# Patient Record
Sex: MALE | Race: OTHER | Employment: FULL TIME | URBAN - METROPOLITAN AREA
[De-identification: names, ages, dates, MRNs, and addresses within clinical notes are randomized per-mention and may not be internally consistent; named-entity substitution may affect disease eponyms.]

---

## 2018-03-12 RX ORDER — METHYLPHENIDATE HYDROCHLORIDE 36 MG/1
36 TABLET ORAL DAILY
Qty: 30 TABLET | Refills: 0 | Status: SHIPPED | OUTPATIENT
Start: 2018-05-12 | End: 2018-06-07 | Stop reason: SDUPTHER

## 2018-03-12 RX ORDER — METHYLPHENIDATE HYDROCHLORIDE 36 MG/1
36 TABLET ORAL DAILY
Qty: 30 TABLET | Refills: 0 | Status: CANCELLED | OUTPATIENT
Start: 2018-04-12

## 2018-03-12 RX ORDER — METHYLPHENIDATE HYDROCHLORIDE 36 MG/1
36 TABLET ORAL DAILY
Qty: 90 TABLET | Refills: 0 | Status: SHIPPED | OUTPATIENT
Start: 2018-03-12 | End: 2018-03-12 | Stop reason: SDUPTHER

## 2018-03-12 RX ORDER — METHYLPHENIDATE HYDROCHLORIDE 36 MG/1
36 TABLET ORAL DAILY
Qty: 30 TABLET | Refills: 0 | Status: SHIPPED | OUTPATIENT
Start: 2018-04-12 | End: 2018-04-16 | Stop reason: SDUPTHER

## 2018-03-12 RX ORDER — METHYLPHENIDATE HYDROCHLORIDE 36 MG/1
36 TABLET ORAL DAILY
COMMUNITY
Start: 2017-10-24 | End: 2018-03-12 | Stop reason: SDUPTHER

## 2018-03-12 NOTE — TELEPHONE ENCOUNTER
Pharmacy called and said ins will not cover 90 day supply since its a narcotic.  Pharmacy needs 2 scripts to be sent post-dated for 30 and 30. Rx's request sent.

## 2018-03-13 ENCOUNTER — TELEPHONE (OUTPATIENT)
Dept: FAMILY MEDICINE | Facility: CLINIC | Age: 43
End: 2018-03-13

## 2018-03-13 NOTE — TELEPHONE ENCOUNTER
Please write a letter stating that he can have a 90 day supply of his concerta 36 mg because he is [place] of the [place] and you approve of it .  It has to have your signature and faxed to [phone]  And his cell phone is [phone] on the letter also.

## 2018-04-16 RX ORDER — METHYLPHENIDATE HYDROCHLORIDE 36 MG/1
36 TABLET ORAL DAILY
Qty: 30 TABLET | Refills: 0 | Status: SHIPPED | OUTPATIENT
Start: 2018-04-16 | End: 2018-11-20

## 2018-06-08 ENCOUNTER — TELEPHONE (OUTPATIENT)
Dept: FAMILY MEDICINE | Facility: CLINIC | Age: 43
End: 2018-06-08

## 2018-06-08 RX ORDER — METHYLPHENIDATE HYDROCHLORIDE 36 MG/1
36 TABLET ORAL DAILY
Qty: 30 TABLET | Refills: 0 | Status: SHIPPED | OUTPATIENT
Start: 2018-06-08 | End: 2018-06-12 | Stop reason: SDUPTHER

## 2018-06-08 NOTE — TELEPHONE ENCOUNTER
I faxed letter to the insurance at [phone]. I called pts wife Airam and LMOM.        Pts wife, Airam called requesting if you could generate a letter stating that pt can have a 90 day supply of his concerta 36 mg because he is out of the country and you approve of it .  It has to have your signature and faxed to his ins at [phone].  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  You had generated a letter before in March 14, 2018.  Thanks

## 2018-06-08 NOTE — LETTER
June 11, 2018    Patient: Phill Brown   YOB: 1975       To Whom it May Concern:    The above takes Concerta for ADD. He is living and working outside of the country at this time and it would be appreciated if he can have a 90 day supply of his medications.        If you have any questions or concerns, please don't hesitate to call.         Sincerely,           Harry A. Frankel, MD

## 2018-06-12 RX ORDER — METHYLPHENIDATE HYDROCHLORIDE 36 MG/1
36 TABLET ORAL DAILY
Qty: 90 TABLET | Refills: 0 | Status: SHIPPED | OUTPATIENT
Start: 2018-06-12 | End: 2018-08-30 | Stop reason: SDUPTHER

## 2018-08-31 RX ORDER — METHYLPHENIDATE HYDROCHLORIDE 36 MG/1
36 TABLET ORAL DAILY
Qty: 30 TABLET | Refills: 0 | Status: SHIPPED | OUTPATIENT
Start: 2018-08-31 | End: 2018-10-10 | Stop reason: SDUPTHER

## 2018-10-10 RX ORDER — METHYLPHENIDATE HYDROCHLORIDE 36 MG/1
36 TABLET ORAL DAILY
Qty: 30 TABLET | Refills: 0 | Status: SHIPPED | OUTPATIENT
Start: 2018-10-10 | End: 2018-11-20 | Stop reason: SDUPTHER

## 2018-10-11 NOTE — TELEPHONE ENCOUNTER
LM for pt that rx was sent to pharmacy and that he needs to schedule appt prior to next prescription

## 2018-10-31 ENCOUNTER — TELEPHONE (OUTPATIENT)
Dept: FAMILY MEDICINE | Facility: CLINIC | Age: 43
End: 2018-10-31

## 2018-10-31 NOTE — TELEPHONE ENCOUNTER
Tc will be back in town in November. Scheduled CPE for 11/20/18 at 4PM. He is wondering if he can get lab slip ahead of time to have bloodwork done before the appointment

## 2018-11-19 PROBLEM — F90.9 ADHD (ATTENTION DEFICIT HYPERACTIVITY DISORDER): Status: ACTIVE | Noted: 2017-10-24

## 2018-11-19 PROBLEM — Z82.49 FAMILY HISTORY OF CORONARY ARTERY DISEASE: Status: ACTIVE | Noted: 2018-11-19

## 2018-11-19 RX ORDER — FLUTICASONE PROPIONATE 50 MCG
SPRAY, SUSPENSION (ML) NASAL
COMMUNITY
Start: 2016-04-27 | End: 2018-11-20

## 2018-11-19 RX ORDER — ALBUTEROL SULFATE 90 UG/1
INHALANT RESPIRATORY (INHALATION)
COMMUNITY
Start: 2016-04-26 | End: 2018-11-20

## 2018-11-19 RX ORDER — MAGNESIUM 200 MG
200 TABLET ORAL
COMMUNITY
Start: 2016-07-25 | End: 2018-11-20

## 2018-11-20 ENCOUNTER — OFFICE VISIT (OUTPATIENT)
Dept: FAMILY MEDICINE | Facility: CLINIC | Age: 43
End: 2018-11-20
Payer: COMMERCIAL

## 2018-11-20 VITALS
HEIGHT: 75 IN | WEIGHT: 268.4 LBS | OXYGEN SATURATION: 97 % | SYSTOLIC BLOOD PRESSURE: 125 MMHG | HEART RATE: 74 BPM | TEMPERATURE: 97.8 F | DIASTOLIC BLOOD PRESSURE: 73 MMHG | BODY MASS INDEX: 33.37 KG/M2

## 2018-11-20 DIAGNOSIS — Z00.00 PREVENTATIVE HEALTH CARE: Primary | ICD-10-CM

## 2018-11-20 DIAGNOSIS — F90.2 ATTENTION DEFICIT HYPERACTIVITY DISORDER (ADHD), COMBINED TYPE: ICD-10-CM

## 2018-11-20 PROCEDURE — 99396 PREV VISIT EST AGE 40-64: CPT | Mod: 25 | Performed by: FAMILY MEDICINE

## 2018-11-20 PROCEDURE — 90471 IMMUNIZATION ADMIN: CPT | Performed by: FAMILY MEDICINE

## 2018-11-20 PROCEDURE — 90686 IIV4 VACC NO PRSV 0.5 ML IM: CPT | Performed by: FAMILY MEDICINE

## 2018-11-20 RX ORDER — METHYLPHENIDATE HYDROCHLORIDE 36 MG/1
36 TABLET ORAL DAILY
Qty: 90 TABLET | Refills: 0 | Status: SHIPPED | OUTPATIENT
Start: 2018-11-20 | End: 2018-11-20 | Stop reason: SDUPTHER

## 2018-11-20 RX ORDER — ALBUTEROL SULFATE 90 UG/1
2 INHALANT RESPIRATORY (INHALATION) EVERY 6 HOURS PRN
Qty: 1 INHALER | Refills: 1 | Status: SHIPPED | OUTPATIENT
Start: 2018-11-20 | End: 2018-11-20 | Stop reason: SDUPTHER

## 2018-11-20 RX ORDER — ALBUTEROL SULFATE 90 UG/1
2 INHALANT RESPIRATORY (INHALATION) EVERY 6 HOURS PRN
Qty: 1 INHALER | Refills: 1 | Status: SHIPPED | OUTPATIENT
Start: 2018-11-20 | End: 2019-05-17

## 2018-11-20 RX ORDER — METHYLPHENIDATE HYDROCHLORIDE 36 MG/1
36 TABLET ORAL DAILY
Qty: 90 TABLET | Refills: 0 | Status: SHIPPED | OUTPATIENT
Start: 2018-11-20 | End: 2019-03-15 | Stop reason: SDUPTHER

## 2018-11-20 ASSESSMENT — ENCOUNTER SYMPTOMS
NERVOUS/ANXIOUS: 0
CONSTIPATION: 0
BLOOD IN STOOL: 0
DIARRHEA: 0
BACK PAIN: 0
POLYPHAGIA: 0
HEADACHES: 0
ARTHRALGIAS: 0
BRUISES/BLEEDS EASILY: 0
POLYDIPSIA: 0
SHORTNESS OF BREATH: 0
FATIGUE: 0

## 2018-11-20 NOTE — PROGRESS NOTES
[facility]  [address] [place] [address]  [phone]       Reason for visit:   Chief Complaint   Patient presents with   • Annual Exam      HPI   Phill Brown is a 43 y.o. male who presents for CPE. He has ADHD and is well controlled and compliant with medications. He is exercising and watching his diet. He is doing yoga which has improved his back problems. He lives in [place].      History reviewed. No pertinent past medical history.  History reviewed. No pertinent surgical history.  Social History     Social History   • Marital status:      Spouse name: N/A   • Number of children: N/A   • Years of education: N/A     Occupational History   • Not on file.     Social History Main Topics   • Smoking status: Never Smoker   • Smokeless tobacco: Never Used   • Alcohol use No   • Drug use: Unknown   • Sexual activity: Not on file     Other Topics Concern   • Not on file     Social History Narrative    Do you wear your seatbelt? Yes    Do you have smoke detector in your home? Yes    Do you have a carbon monoxide detector in your home? Yes    Current Occupation?    Current Marital Status?          History reviewed. No pertinent family history.  [employer]ill[employer]  Current Outpatient Prescriptions   Medication Sig Dispense Refill   • methylphenidate HCl 36 mg ER tablet Take 36 mg by mouth daily. 90 tablet 0   • multivit with minerals/lutein (MULTIVITAMIN 50 PLUS ORAL) No SIG Entered     • albuterol HFA (VENTOLIN HFA) 90 mcg/actuation inhaler Inhale 2 puffs every 6 (six) hours as needed for wheezing. 1 Inhaler 1     No current facility-administered medications for this visit.        Review of Systems   Constitutional: Negative for fatigue.   HENT: Negative for hearing loss.    Eyes: Negative for visual disturbance.   Respiratory: Negative for shortness of breath.    Cardiovascular: Negative for chest pain.   Gastrointestinal: Negative for blood in  "stool, constipation and diarrhea.   Endocrine: Negative for polydipsia, polyphagia and polyuria.   Musculoskeletal: Negative for arthralgias and back pain.   Skin: Negative for rash.   Neurological: Negative for headaches.   Hematological: Does not bruise/bleed easily.   Psychiatric/Behavioral: The patient is not nervous/anxious.      Objective   Vitals:    11/20/18 1619   BP: 125/73   BP Location: Left upper arm   Patient Position: Sitting   Pulse: 74   Temp: 36.6 °C (97.8 °F)   TempSrc: Oral   SpO2: 97%   Weight: 122 kg (268 lb 6.4 oz)   Height: 1.905 m (6' 3\")     Body mass index is 33.55 kg/m².  Physical Exam   Constitutional: He is oriented to person, place, and time. He appears well-developed and well-nourished.   HENT:   Right Ear: Tympanic membrane normal.   Left Ear: Tympanic membrane normal.   Mouth/Throat: Oropharynx is clear and moist.   Eyes: Conjunctivae are normal. Pupils are equal, round, and reactive to light.   Neck: No thyromegaly present.   Cardiovascular: Normal rate, regular rhythm and intact distal pulses.    Murmur heard.  Pulmonary/Chest: Effort normal and breath sounds normal.   Abdominal: Soft. Bowel sounds are normal. There is no hepatosplenomegaly. Hernia confirmed negative in the right inguinal area and confirmed negative in the left inguinal area.   Genitourinary: Testes normal.   Musculoskeletal: Normal range of motion. He exhibits no edema.   Neurological: He is alert and oriented to person, place, and time. He displays normal reflexes.   Skin: Skin is warm and dry.   Psychiatric: He has a normal mood and affect. His behavior is normal. Judgment and thought content normal.       Procedures    Lab Results   Component Value Date    WBC 6.1 07/25/2017    HGB 16.4 07/25/2017    HCT 48.9 07/25/2017     07/25/2017    CHOL 194 07/25/2017    TRIG 94 07/25/2017    HDL 69 07/25/2017    LDLCALC 106 (H) 07/25/2017    ALT 25 07/25/2017    AST 24 07/25/2017     07/25/2017    K 4.2 " 07/25/2017     07/25/2017    CREATININE 1.59 (H) 07/25/2017    BUN 23 07/25/2017    CO2 23 07/25/2017    GLUCOSE 90 07/25/2017           Assessment   Problem List Items Addressed This Visit     ADHD (attention deficit hyperactivity disorder)     Stable  Continue medications   Labs         Relevant Medications    methylphenidate HCl 36 mg ER tablet    Preventative health care - Primary     Labs  Flu vax         Relevant Orders    CBC and differential    Comprehensive metabolic panel    Lipid panel              Harry A. Frankel, MD  11/20/2018

## 2018-11-21 ENCOUNTER — TELEPHONE (OUTPATIENT)
Dept: FAMILY MEDICINE | Facility: CLINIC | Age: 43
End: 2018-11-21

## 2018-11-24 LAB
ALBUMIN SERPL-MCNC: 4.3 G/DL (ref 3.5–5.5)
ALBUMIN/GLOB SERPL: 1.9 {RATIO} (ref 1.2–2.2)
ALP SERPL-CCNC: 51 IU/L (ref 39–117)
ALT SERPL-CCNC: 34 IU/L (ref 0–44)
AST SERPL-CCNC: 32 IU/L (ref 0–40)
BASOPHILS # BLD AUTO: 0 X10E3/UL (ref 0–0.2)
BASOPHILS NFR BLD AUTO: 1 %
BILIRUB SERPL-MCNC: 0.6 MG/DL (ref 0–1.2)
BUN SERPL-MCNC: 20 MG/DL (ref 6–24)
BUN/CREAT SERPL: 16 (ref 9–20)
CALCIUM SERPL-MCNC: 9.1 MG/DL (ref 8.7–10.2)
CHLORIDE SERPL-SCNC: 103 MMOL/L (ref 96–106)
CHOLEST SERPL-MCNC: 163 MG/DL (ref 100–199)
CO2 SERPL-SCNC: 24 MMOL/L (ref 20–29)
CREAT SERPL-MCNC: 1.23 MG/DL (ref 0.76–1.27)
EOSINOPHIL # BLD AUTO: 0.1 X10E3/UL (ref 0–0.4)
EOSINOPHIL NFR BLD AUTO: 2 %
ERYTHROCYTE [DISTWIDTH] IN BLOOD BY AUTOMATED COUNT: 14.7 % (ref 12.3–15.4)
GLOBULIN SER CALC-MCNC: 2.3 G/DL (ref 1.5–4.5)
GLUCOSE SERPL-MCNC: 99 MG/DL (ref 65–99)
HCT VFR BLD AUTO: 46 % (ref 37.5–51)
HDLC SERPL-MCNC: 55 MG/DL
HGB BLD-MCNC: 15.6 G/DL (ref 13–17.7)
IMM GRANULOCYTES # BLD: 0 X10E3/UL (ref 0–0.1)
IMM GRANULOCYTES NFR BLD: 0 %
LAB CORP EGFR IF AFRICN AM: 82 ML/MIN/1.73
LAB CORP EGFR IF NONAFRICN AM: 71 ML/MIN/1.73
LDLC SERPL CALC-MCNC: 89 MG/DL (ref 0–99)
LYMPHOCYTES # BLD AUTO: 2.5 X10E3/UL (ref 0.7–3.1)
LYMPHOCYTES NFR BLD AUTO: 38 %
MCH RBC QN AUTO: 31 PG (ref 26.6–33)
MCHC RBC AUTO-ENTMCNC: 33.9 G/DL (ref 31.5–35.7)
MCV RBC AUTO: 91 FL (ref 79–97)
MONOCYTES # BLD AUTO: 0.6 X10E3/UL (ref 0.1–0.9)
MONOCYTES NFR BLD AUTO: 10 %
NEUTROPHILS # BLD AUTO: 3.3 X10E3/UL (ref 1.4–7)
NEUTROPHILS NFR BLD AUTO: 49 %
PLATELET # BLD AUTO: 214 X10E3/UL (ref 150–379)
POTASSIUM SERPL-SCNC: 4.3 MMOL/L (ref 3.5–5.2)
PROT SERPL-MCNC: 6.6 G/DL (ref 6–8.5)
RBC # BLD AUTO: 5.04 X10E6/UL (ref 4.14–5.8)
SODIUM SERPL-SCNC: 141 MMOL/L (ref 134–144)
TRIGL SERPL-MCNC: 95 MG/DL (ref 0–149)
VLDLC SERPL CALC-MCNC: 19 MG/DL (ref 5–40)
WBC # BLD AUTO: 6.5 X10E3/UL (ref 3.4–10.8)

## 2019-03-18 RX ORDER — METHYLPHENIDATE HYDROCHLORIDE 36 MG/1
36 TABLET ORAL DAILY
Qty: 90 TABLET | Refills: 0 | Status: SHIPPED | OUTPATIENT
Start: 2019-03-18 | End: 2019-04-26 | Stop reason: SDUPTHER

## 2019-04-26 RX ORDER — METHYLPHENIDATE HYDROCHLORIDE 36 MG/1
36 TABLET ORAL DAILY
Qty: 30 TABLET | Refills: 0 | Status: SHIPPED | OUTPATIENT
Start: 2019-04-26 | End: 2019-12-27 | Stop reason: SDUPTHER

## 2019-05-06 ENCOUNTER — HOSPITAL ENCOUNTER (OUTPATIENT)
Dept: RADIOLOGY | Facility: HOSPITAL | Age: 44
Discharge: HOME | End: 2019-05-06
Attending: FAMILY MEDICINE
Payer: COMMERCIAL

## 2019-05-06 ENCOUNTER — OFFICE VISIT (OUTPATIENT)
Dept: FAMILY MEDICINE | Facility: CLINIC | Age: 44
End: 2019-05-06
Payer: COMMERCIAL

## 2019-05-06 VITALS
TEMPERATURE: 98.6 F | WEIGHT: 256 LBS | DIASTOLIC BLOOD PRESSURE: 89 MMHG | BODY MASS INDEX: 31.83 KG/M2 | HEART RATE: 80 BPM | HEIGHT: 75 IN | OXYGEN SATURATION: 97 % | SYSTOLIC BLOOD PRESSURE: 139 MMHG

## 2019-05-06 DIAGNOSIS — A90 DENGUE FEVER: Primary | ICD-10-CM

## 2019-05-06 DIAGNOSIS — A90 DENGUE FEVER: ICD-10-CM

## 2019-05-06 DIAGNOSIS — F90.2 ATTENTION DEFICIT HYPERACTIVITY DISORDER (ADHD), COMBINED TYPE: ICD-10-CM

## 2019-05-06 PROCEDURE — 99213 OFFICE O/P EST LOW 20 MIN: CPT | Performed by: FAMILY MEDICINE

## 2019-05-06 PROCEDURE — 71046 X-RAY EXAM CHEST 2 VIEWS: CPT

## 2019-05-06 RX ORDER — PREDNISONE 20 MG/1
40 TABLET ORAL DAILY
Qty: 10 TABLET | Refills: 0 | Status: SHIPPED | OUTPATIENT
Start: 2019-05-06 | End: 2019-05-17

## 2019-05-06 RX ORDER — BENZONATATE 100 MG/1
100 CAPSULE ORAL 4 TIMES DAILY PRN
Qty: 30 CAPSULE | Refills: 1 | Status: SHIPPED | OUTPATIENT
Start: 2019-05-06 | End: 2019-05-17

## 2019-05-06 ASSESSMENT — ENCOUNTER SYMPTOMS
COUGH: 1
FEVER: 0
SHORTNESS OF BREATH: 0
SORE THROAT: 0

## 2019-05-06 NOTE — ASSESSMENT & PLAN NOTE
He has a residual cough but his high fevers are gone  CXR  Tessalon pearls  Prednisone 40 mg daily for 5 days

## 2019-05-06 NOTE — PROGRESS NOTES
[facility]  [address] [place] [address]  [phone]       Reason for visit:   Chief Complaint   Patient presents with   • Cough     x 1 week      HPI   Phill Brown is a 43 y.o. male who presents with sick. He was treated in [place] for Dengue fever. He is continuing to cough. He has been needing his inhaler. No fever for about 1 week.      History reviewed. No pertinent past medical history.  History reviewed. No pertinent surgical history.  Social History     Social History   • Marital status:      Spouse name: N/A   • Number of children: N/A   • Years of education: N/A     Occupational History   • Not on file.     Social History Main Topics   • Smoking status: Never Smoker   • Smokeless tobacco: Never Used   • Alcohol use No   • Drug use: Unknown   • Sexual activity: Not on file     Other Topics Concern   • Not on file     Social History Narrative    Do you wear your seatbelt? Yes    Do you have smoke detector in your home? Yes    Do you have a carbon monoxide detector in your home? Yes    Current Occupation?    Current Marital Status?          History reviewed. No pertinent family history.  [employer]ill[employer]  Current Outpatient Prescriptions   Medication Sig Dispense Refill   • methylphenidate HCl 36 mg ER tablet Take 36 mg by mouth daily. 30 tablet 0   • multivit with minerals/lutein (MULTIVITAMIN 50 PLUS ORAL) No SIG Entered     • benzonatate (TESSALON PERLES) 100 mg capsule Take 1 capsule (100 mg total) by mouth 4 (four) times a day as needed for cough for up to 10 days. 30 capsule 1   • predniSONE (DELTASONE) 20 mg tablet Take 2 tablets (40 mg total) by mouth daily for 5 days. 10 tablet 0     No current facility-administered medications for this visit.        Patient Active Problem List    Diagnosis Date Noted   • Dengue fever 05/06/2019   • Preventative health care 11/20/2018   • Family history of coronary artery disease 11/19/2018  "  • ADHD (attention deficit hyperactivity disorder) 10/24/2017   • Atypical chest pain 04/27/2016   • Shortness of breath 04/27/2016   • Arthritis 04/26/2016   • Asthma 04/26/2016         Review of Systems   Constitutional: Negative for fever.   HENT: Negative for ear pain and sore throat.    Respiratory: Positive for cough. Negative for shortness of breath.      Objective   Vitals:    05/06/19 1558   BP: 139/89   BP Location: Left upper arm   Patient Position: Sitting   Pulse: 80   Temp: 37 °C (98.6 °F)   TempSrc: Oral   SpO2: 97%   Weight: 116 kg (256 lb)   Height: 1.905 m (6' 3\")     Body mass index is 32 kg/m².  Physical Exam   Constitutional: He appears well-developed and well-nourished.   HENT:   Right Ear: Tympanic membrane normal.   Left Ear: Tympanic membrane normal.   Mouth/Throat: Oropharynx is clear and moist.   Pulmonary/Chest: Effort normal and breath sounds normal.   Lymphadenopathy:     He has no cervical adenopathy.       Procedures    Lab Results   Component Value Date    WBC 6.5 11/23/2018    HGB 15.6 11/23/2018    HCT 46.0 11/23/2018     11/23/2018    CHOL 163 11/23/2018    TRIG 95 11/23/2018    HDL 55 11/23/2018    LDLCALC 89 11/23/2018    ALT 34 11/23/2018    AST 32 11/23/2018     11/23/2018    K 4.3 11/23/2018     11/23/2018    CREATININE 1.23 11/23/2018    BUN 20 11/23/2018    CO2 24 11/23/2018    GLUCOSE 99 11/23/2018           Assessment   Problem List Items Addressed This Visit     ADHD (attention deficit hyperactivity disorder)     Stable  Continue meds         Dengue fever - Primary     He has a residual cough but his high fevers are gone  CXR  Tessalon pearls  Prednisone 40 mg daily for 5 days         Relevant Orders    X-RAY CHEST 2 VIEWS              Harry A. Frankel, MD  5/6/2019                   "

## 2019-05-07 ENCOUNTER — TELEPHONE (OUTPATIENT)
Dept: FAMILY MEDICINE | Facility: CLINIC | Age: 44
End: 2019-05-07

## 2019-05-07 NOTE — TELEPHONE ENCOUNTER
Tc is waiting for results from chest xray, was expecting call last night. Can you please call Airam with results? 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

## 2019-05-17 ENCOUNTER — OFFICE VISIT (OUTPATIENT)
Dept: FAMILY MEDICINE | Facility: CLINIC | Age: 44
End: 2019-05-17
Payer: COMMERCIAL

## 2019-05-17 VITALS
WEIGHT: 270 LBS | BODY MASS INDEX: 33.57 KG/M2 | HEART RATE: 88 BPM | SYSTOLIC BLOOD PRESSURE: 123 MMHG | DIASTOLIC BLOOD PRESSURE: 84 MMHG | OXYGEN SATURATION: 97 % | HEIGHT: 75 IN | TEMPERATURE: 97.8 F

## 2019-05-17 DIAGNOSIS — R05.9 COUGH: Primary | ICD-10-CM

## 2019-05-17 PROCEDURE — 99213 OFFICE O/P EST LOW 20 MIN: CPT | Performed by: FAMILY MEDICINE

## 2019-05-17 RX ORDER — AZITHROMYCIN 250 MG/1
TABLET, FILM COATED ORAL
Qty: 6 TABLET | Refills: 0 | Status: SHIPPED | OUTPATIENT
Start: 2019-05-17 | End: 2019-06-04 | Stop reason: SDUPTHER

## 2019-05-17 RX ORDER — ALBUTEROL SULFATE 90 UG/1
INHALANT RESPIRATORY (INHALATION)
Refills: 1 | COMMUNITY
Start: 2019-05-07 | End: 2021-08-26 | Stop reason: SDUPTHER

## 2019-05-17 ASSESSMENT — ENCOUNTER SYMPTOMS
SHORTNESS OF BREATH: 1
WHEEZING: 0
FEVER: 0
COUGH: 1

## 2019-05-17 NOTE — ASSESSMENT & PLAN NOTE
Cough is secondary to Dengue fever.  He is markely better from last week  His CXR was normal.  He may have a secondary bronchitis and he will be traveling.  Mucinex DM  Zithromax Z david

## 2019-05-17 NOTE — PROGRESS NOTES
[facility]  [address] [place] [address]  [phone]       Reason for visit:   Chief Complaint   Patient presents with   • Cough      HPI   Phill Brown is a 43 y.o. male who presents with cough. He is markedly better than last week but hs still has chest congestion. No fever. Mucous is clear. No worsening since he finished the prednisone. He has had associated left shoulder pain.      Past Medical History:   Diagnosis Date   • ADHD      Past Surgical History:   Procedure Laterality Date   • APPENDECTOMY       Social History     Social History   • Marital status:      Spouse name: N/A   • Number of children: N/A   • Years of education: N/A     Occupational History   • Not on file.     Social History Main Topics   • Smoking status: Never Smoker   • Smokeless tobacco: Never Used   • Alcohol use No   • Drug use: No   • Sexual activity: Not on file     Other Topics Concern   • Not on file     Social History Narrative    Do you wear your seatbelt? Yes    Do you have smoke detector in your home? Yes    Do you have a carbon monoxide detector in your home? Yes    Current Occupation?    Current Marital Status?          History reviewed. No pertinent family history.  [employer]ill[employer]  Current Outpatient Prescriptions   Medication Sig Dispense Refill   • albuterol HFA (VENTOLIN HFA) 90 mcg/actuation inhaler INHALE 2 PUFFS BY MOUTH EVERY 6 HOURS AS NEEDED WHEEZING  1   • methylphenidate HCl 36 mg ER tablet Take 36 mg by mouth daily. 30 tablet 0   • multivit with minerals/lutein (MULTIVITAMIN 50 PLUS ORAL) No SIG Entered     • azithromycin (ZITHROMAX) 250 mg tablet Take 2 tablets the first day, then 1 tablet daily for 4 days. 6 tablet 0     No current facility-administered medications for this visit.        Patient Active Problem List    Diagnosis Date Noted   • Cough 05/17/2019   • Dengue fever 05/06/2019   • Preventative health care 11/20/2018   •  "Family history of coronary artery disease 11/19/2018   • ADHD (attention deficit hyperactivity disorder) 10/24/2017   • Atypical chest pain 04/27/2016   • Shortness of breath 04/27/2016   • Arthritis 04/26/2016   • Asthma 04/26/2016         Review of Systems   Constitutional: Negative for fever.   Respiratory: Positive for cough and shortness of breath. Negative for wheezing.      Objective   Vitals:    05/17/19 0819   BP: 123/84   BP Location: Left upper arm   Patient Position: Sitting   Pulse: 88   Temp: 36.6 °C (97.8 °F)   TempSrc: Oral   SpO2: 97%   Weight: 122 kg (270 lb)   Height: 1.905 m (6' 3\")     Body mass index is 33.75 kg/m².  Physical Exam   Constitutional: He appears well-developed and well-nourished.   HENT:   Right Ear: Tympanic membrane normal.   Left Ear: Tympanic membrane normal.   Mouth/Throat: Oropharynx is clear and moist.   Cardiovascular: Normal rate, regular rhythm, normal heart sounds and intact distal pulses.    No murmur heard.  Pulmonary/Chest: Effort normal and breath sounds normal. He has no wheezes. He has no rales.       Procedures    Lab Results   Component Value Date    WBC 6.5 11/23/2018    HGB 15.6 11/23/2018    HCT 46.0 11/23/2018     11/23/2018    CHOL 163 11/23/2018    TRIG 95 11/23/2018    HDL 55 11/23/2018    LDLCALC 89 11/23/2018    ALT 34 11/23/2018    AST 32 11/23/2018     11/23/2018    K 4.3 11/23/2018     11/23/2018    CREATININE 1.23 11/23/2018    BUN 20 11/23/2018    CO2 24 11/23/2018    GLUCOSE 99 11/23/2018           Assessment   Problem List Items Addressed This Visit     Cough - Primary     Cough is secondary to Dengue fever.  He is markely better from last week  His CXR was normal.  He may have a secondary bronchitis and he will be traveling.  Mucinex DM  Zithromax Z david                   Harry A. Frankel, MD  5/17/2019                   "

## 2019-06-04 ENCOUNTER — OFFICE VISIT (OUTPATIENT)
Dept: FAMILY MEDICINE | Facility: CLINIC | Age: 44
End: 2019-06-04
Payer: COMMERCIAL

## 2019-06-04 VITALS
HEART RATE: 83 BPM | WEIGHT: 268 LBS | TEMPERATURE: 98.2 F | HEIGHT: 75 IN | OXYGEN SATURATION: 97 % | DIASTOLIC BLOOD PRESSURE: 80 MMHG | BODY MASS INDEX: 33.32 KG/M2 | SYSTOLIC BLOOD PRESSURE: 144 MMHG

## 2019-06-04 DIAGNOSIS — F90.2 ATTENTION DEFICIT HYPERACTIVITY DISORDER (ADHD), COMBINED TYPE: ICD-10-CM

## 2019-06-04 DIAGNOSIS — J40 BRONCHITIS: Primary | ICD-10-CM

## 2019-06-04 PROCEDURE — 99213 OFFICE O/P EST LOW 20 MIN: CPT | Performed by: FAMILY MEDICINE

## 2019-06-04 RX ORDER — FLUTICASONE PROPIONATE 50 MCG
1 SPRAY, SUSPENSION (ML) NASAL DAILY
Qty: 1 BOTTLE | Refills: 5 | Status: SHIPPED | OUTPATIENT
Start: 2019-06-04 | End: 2019-12-27

## 2019-06-04 RX ORDER — AZITHROMYCIN 250 MG/1
TABLET, FILM COATED ORAL
Qty: 6 TABLET | Refills: 0 | Status: SHIPPED | OUTPATIENT
Start: 2019-06-04 | End: 2019-12-27

## 2019-06-04 RX ORDER — AZELASTINE 1 MG/ML
1 SPRAY, METERED NASAL 2 TIMES DAILY
Qty: 30 ML | Refills: 5 | Status: SHIPPED | OUTPATIENT
Start: 2019-06-04 | End: 2019-12-27

## 2019-06-04 ASSESSMENT — ENCOUNTER SYMPTOMS
COUGH: 1
SORE THROAT: 0

## 2019-06-04 NOTE — PROGRESS NOTES
[facility]  [address] [place] [address]  [phone]       Reason for visit:   Chief Complaint   Patient presents with   • Cough   • Post Nasal Drip      HPI   Phill Brown is a 43 y.o. male who presents with sick.He has cough, fever and PND. He has been on prednisone that he got in [place]. He is still on meds for ADHD and it doing well.      Past Medical History:   Diagnosis Date   • ADHD      Past Surgical History:   Procedure Laterality Date   • APPENDECTOMY       Social History     Social History   • Marital status:      Spouse name: N/A   • Number of children: N/A   • Years of education: N/A     Occupational History   • Not on file.     Social History Main Topics   • Smoking status: Never Smoker   • Smokeless tobacco: Never Used   • Alcohol use No   • Drug use: No   • Sexual activity: Not on file     Other Topics Concern   • Not on file     Social History Narrative    Do you wear your seatbelt? Yes    Do you have smoke detector in your home? Yes    Do you have a carbon monoxide detector in your home? Yes    Current Occupation?    Current Marital Status?          History reviewed. No pertinent family history.  [employer]ill[employer]  Current Outpatient Prescriptions   Medication Sig Dispense Refill   • albuterol HFA (VENTOLIN HFA) 90 mcg/actuation inhaler INHALE 2 PUFFS BY MOUTH EVERY 6 HOURS AS NEEDED WHEEZING  1   • methylphenidate HCl 36 mg ER tablet Take 36 mg by mouth daily. 30 tablet 0   • multivit with minerals/lutein (MULTIVITAMIN 50 PLUS ORAL) No SIG Entered     • azelastine (ASTELIN) 137 mcg (0.1 %) nasal spray Administer 1 spray into each nostril 2 (two) times a day. Use in each nostril as directed. 30 mL 5   • azithromycin (ZITHROMAX) 250 mg tablet Take 2 tablets the first day, then 1 tablet daily for 4 days. 6 tablet 0   • fluticasone propionate (FLONASE) 50 mcg/actuation nasal spray Administer 1 spray into each nostril daily. 1  "Bottle 5     No current facility-administered medications for this visit.        Patient Active Problem List    Diagnosis Date Noted   • Bronchitis 06/04/2019   • Cough 05/17/2019   • Dengue fever 05/06/2019   • Preventative health care 11/20/2018   • Family history of coronary artery disease 11/19/2018   • ADHD (attention deficit hyperactivity disorder) 10/24/2017   • Atypical chest pain 04/27/2016   • Shortness of breath 04/27/2016   • Arthritis 04/26/2016   • Asthma 04/26/2016         Review of Systems   HENT: Positive for congestion and ear pain. Negative for sore throat.    Respiratory: Positive for cough.      Objective   Vitals:    06/04/19 1453   BP: (!) 144/80   BP Location: Left upper arm   Patient Position: Sitting   Pulse: 83   Temp: 36.8 °C (98.2 °F)   TempSrc: Oral   SpO2: 97%   Weight: 122 kg (268 lb)   Height: 1.905 m (6' 3\")     Body mass index is 33.5 kg/m².  Physical Exam   Constitutional: He appears well-developed and well-nourished.   HENT:   Right Ear: Tympanic membrane normal.   Left Ear: Tympanic membrane normal.   Mouth/Throat: Oropharynx is clear and moist.   Pulmonary/Chest: Effort normal and breath sounds normal.       Procedures    Lab Results   Component Value Date    WBC 6.5 11/23/2018    HGB 15.6 11/23/2018    HCT 46.0 11/23/2018     11/23/2018    CHOL 163 11/23/2018    TRIG 95 11/23/2018    HDL 55 11/23/2018    LDLCALC 89 11/23/2018    ALT 34 11/23/2018    AST 32 11/23/2018     11/23/2018    K 4.3 11/23/2018     11/23/2018    CREATININE 1.23 11/23/2018    BUN 20 11/23/2018    CO2 24 11/23/2018    GLUCOSE 99 11/23/2018           Assessment   Problem List Items Addressed This Visit     ADHD (attention deficit hyperactivity disorder)     Well controlled  Continue meds         Bronchitis - Primary     Zithromax Z david  Flonase  Astelin  Push fluids                   Harry A. Frankel, MD  6/5/2019                   "

## 2019-12-27 ENCOUNTER — OFFICE VISIT (OUTPATIENT)
Dept: FAMILY MEDICINE | Facility: CLINIC | Age: 44
End: 2019-12-27
Payer: COMMERCIAL

## 2019-12-27 VITALS
HEIGHT: 75 IN | WEIGHT: 269.8 LBS | HEART RATE: 96 BPM | OXYGEN SATURATION: 96 % | SYSTOLIC BLOOD PRESSURE: 105 MMHG | DIASTOLIC BLOOD PRESSURE: 67 MMHG | BODY MASS INDEX: 33.55 KG/M2 | TEMPERATURE: 97.9 F

## 2019-12-27 DIAGNOSIS — Z00.00 PREVENTATIVE HEALTH CARE: Primary | ICD-10-CM

## 2019-12-27 DIAGNOSIS — G89.29 CHRONIC LEFT SHOULDER PAIN: ICD-10-CM

## 2019-12-27 DIAGNOSIS — M25.512 CHRONIC LEFT SHOULDER PAIN: ICD-10-CM

## 2019-12-27 DIAGNOSIS — F90.2 ATTENTION DEFICIT HYPERACTIVITY DISORDER (ADHD), COMBINED TYPE: ICD-10-CM

## 2019-12-27 PROCEDURE — 90686 IIV4 VACC NO PRSV 0.5 ML IM: CPT | Performed by: FAMILY MEDICINE

## 2019-12-27 PROCEDURE — 90471 IMMUNIZATION ADMIN: CPT | Performed by: FAMILY MEDICINE

## 2019-12-27 PROCEDURE — 99396 PREV VISIT EST AGE 40-64: CPT | Mod: 25 | Performed by: FAMILY MEDICINE

## 2019-12-27 RX ORDER — METHYLPHENIDATE HYDROCHLORIDE 36 MG/1
36 TABLET ORAL DAILY
Qty: 90 TABLET | Refills: 0 | Status: SHIPPED | OUTPATIENT
Start: 2019-12-27 | End: 2020-10-26 | Stop reason: SDUPTHER

## 2019-12-27 RX ORDER — TALC
3 POWDER (GRAM) TOPICAL NIGHTLY
COMMUNITY
End: 2023-06-01

## 2019-12-27 ASSESSMENT — ENCOUNTER SYMPTOMS
BRUISES/BLEEDS EASILY: 0
POLYPHAGIA: 0
BLOOD IN STOOL: 0
BACK PAIN: 0
SHORTNESS OF BREATH: 0
FATIGUE: 0
CONSTIPATION: 0
NERVOUS/ANXIOUS: 0
DIARRHEA: 0
HEADACHES: 0
POLYDIPSIA: 0
ARTHRALGIAS: 1

## 2019-12-27 NOTE — PROGRESS NOTES
[place] [address]  [place] [address]  [phone]       Reason for visit:   Chief Complaint   Patient presents with   • Follow-up     For med check      HPI   Phill Brown is a 44 y.o. male who presents for CPE. He has ADHD and is well controlled on meds. He has left shoulder pain. He exercises regularly. Diet is fair.      Past Medical History:   Diagnosis Date   • ADHD      Past Surgical History:   Procedure Laterality Date   • APPENDECTOMY       Social History     Socioeconomic History   • Marital status:      Spouse name: Not on file   • Number of children: Not on file   • Years of education: Not on file   • Highest education level: Not on file   Occupational History   • Not on file   Social Needs   • Financial resource strain: Not on file   • Food insecurity:     Worry: Not on file     Inability: Not on file   • Transportation needs:     Medical: Not on file     Non-medical: Not on file   Tobacco Use   • Smoking status: Never Smoker   • Smokeless tobacco: Never Used   Substance and Sexual Activity   • Alcohol use: No   • Drug use: No   • Sexual activity: Not on file   Lifestyle   • Physical activity:     Days per week: Not on file     Minutes per session: Not on file   • Stress: Not on file   Relationships   • Social connections:     Talks on phone: Not on file     Gets together: Not on file     Attends [employer]ical service: Not on file     Active member of club or organization: Not on file     Attends meetings of clubs or organizations: Not on file     Relationship status: Not on file   • Intimate partner violence:     Fear of current or ex partner: Not on file     Emotionally abused: Not on file     Physically abused: Not on file     Forced sexual activity: Not on file   Other Topics Concern   • Not on file   Social History Narrative    Do you wear your seatbelt? Yes    Do you have smoke detector in your home? Yes    Do you have a carbon monoxide  "detector in your home? Yes    Current Occupation?    Current Marital Status?      History reviewed. No pertinent family history.  [employer]ill[employer]  Current Outpatient Medications   Medication Sig Dispense Refill   • albuterol HFA (VENTOLIN HFA) 90 mcg/actuation inhaler INHALE 2 PUFFS BY MOUTH EVERY 6 HOURS AS NEEDED WHEEZING  1   • melatonin 3 mg tablet Take 3 mg by mouth nightly.     • methylphenidate HCl 36 mg ER tablet Take 36 mg by mouth daily. 90 tablet 0   • multivit with minerals/lutein (MULTIVITAMIN 50 PLUS ORAL) No SIG Entered       No current facility-administered medications for this visit.        Patient Active Problem List    Diagnosis Date Noted   • Chronic left shoulder pain 12/27/2019   • Bronchitis 06/04/2019   • Cough 05/17/2019   • Dengue fever 05/06/2019   • Preventative health care 11/20/2018   • Family history of coronary artery disease 11/19/2018   • ADHD (attention deficit hyperactivity disorder) 10/24/2017   • Atypical chest pain 04/27/2016   • Shortness of breath 04/27/2016   • Arthritis 04/26/2016   • Asthma 04/26/2016         Review of Systems   Constitutional: Negative for fatigue.   HENT: Negative for hearing loss.    Eyes: Negative for visual disturbance.   Respiratory: Negative for shortness of breath.    Cardiovascular: Negative for chest pain.   Gastrointestinal: Negative for blood in stool, constipation and diarrhea.   Endocrine: Negative for polydipsia, polyphagia and polyuria.   Musculoskeletal: Positive for arthralgias. Negative for back pain.   Skin: Negative for rash.   Neurological: Negative for headaches.   Hematological: Does not bruise/bleed easily.   Psychiatric/Behavioral: The patient is not nervous/anxious.      Objective   Vitals:    12/27/19 0810   BP: 105/67   BP Location: Left upper arm   Patient Position: Sitting   Pulse: 96   Temp: 36.6 °C (97.9 °F)   TempSrc: Oral   SpO2: 96%   Weight: 122 kg (269 lb 12.8 oz)   Height: 1.905 m (6' 3\")     Body mass " index is 33.72 kg/m².  Physical Exam   Constitutional: He is oriented to person, place, and time. He appears well-developed and well-nourished.   HENT:   Right Ear: Tympanic membrane normal.   Left Ear: Tympanic membrane normal.   Mouth/Throat: Oropharynx is clear and moist.   Eyes: Pupils are equal, round, and reactive to light. Conjunctivae are normal.   Neck: No thyromegaly present.   Cardiovascular: Normal rate, regular rhythm, normal heart sounds and intact distal pulses.   No murmur heard.  Pulmonary/Chest: Effort normal and breath sounds normal.   Abdominal: Soft. Bowel sounds are normal. There is no hepatosplenomegaly.   Musculoskeletal: Normal range of motion. He exhibits tenderness. He exhibits no edema.   Lymphadenopathy:     He has no cervical adenopathy.   Neurological: He is alert and oriented to person, place, and time. He displays normal reflexes.   Skin: Skin is warm and dry.   Psychiatric: He has a normal mood and affect. His behavior is normal. Judgment and thought content normal.       Procedures    Lab Results   Component Value Date    WBC 6.5 11/23/2018    HGB 15.6 11/23/2018    HCT 46.0 11/23/2018     11/23/2018    CHOL 163 11/23/2018    TRIG 95 11/23/2018    HDL 55 11/23/2018    LDLCALC 89 11/23/2018    ALT 34 11/23/2018    AST 32 11/23/2018     11/23/2018    K 4.3 11/23/2018     11/23/2018    CREATININE 1.23 11/23/2018    BUN 20 11/23/2018    CO2 24 11/23/2018    GLUCOSE 99 11/23/2018           Assessment   Problem List Items Addressed This Visit        Nervous    Chronic left shoulder pain     [facility]            Other    ADHD (attention deficit hyperactivity disorder)     Stable  Continue meds         Relevant Medications    methylphenidate HCl 36 mg ER tablet    Preventative health care - Primary     Labs  Flu vax         Relevant Orders    CBC and Differential    Comprehensive metabolic panel    Lipid panel              Harry A. Frankel, MD  12/27/2019

## 2019-12-29 LAB
ALBUMIN SERPL-MCNC: 4.5 G/DL (ref 3.5–5.5)
ALBUMIN/GLOB SERPL: 2.1 {RATIO} (ref 1.2–2.2)
ALP SERPL-CCNC: 43 IU/L (ref 39–117)
ALT SERPL-CCNC: 47 IU/L (ref 0–44)
AST SERPL-CCNC: 30 IU/L (ref 0–40)
BASOPHILS # BLD AUTO: 0 X10E3/UL (ref 0–0.2)
BASOPHILS NFR BLD AUTO: 1 %
BILIRUB SERPL-MCNC: 1.1 MG/DL (ref 0–1.2)
BUN SERPL-MCNC: 19 MG/DL (ref 6–24)
BUN/CREAT SERPL: 16 (ref 9–20)
CALCIUM SERPL-MCNC: 9.1 MG/DL (ref 8.7–10.2)
CHLORIDE SERPL-SCNC: 103 MMOL/L (ref 96–106)
CHOLEST SERPL-MCNC: 185 MG/DL (ref 100–199)
CO2 SERPL-SCNC: 25 MMOL/L (ref 20–29)
CREAT SERPL-MCNC: 1.21 MG/DL (ref 0.76–1.27)
EOSINOPHIL # BLD AUTO: 0.6 X10E3/UL (ref 0–0.4)
EOSINOPHIL NFR BLD AUTO: 9 %
ERYTHROCYTE [DISTWIDTH] IN BLOOD BY AUTOMATED COUNT: 14.4 % (ref 12.3–15.4)
GLOBULIN SER CALC-MCNC: 2.1 G/DL (ref 1.5–4.5)
GLUCOSE SERPL-MCNC: 107 MG/DL (ref 65–99)
HCT VFR BLD AUTO: 47.8 % (ref 37.5–51)
HDLC SERPL-MCNC: 56 MG/DL
HGB BLD-MCNC: 15.9 G/DL (ref 13–17.7)
IMM GRANULOCYTES # BLD AUTO: 0 X10E3/UL (ref 0–0.1)
IMM GRANULOCYTES NFR BLD AUTO: 0 %
LAB CORP EGFR IF AFRICN AM: 83 ML/MIN/1.73
LAB CORP EGFR IF NONAFRICN AM: 72 ML/MIN/1.73
LDLC SERPL CALC-MCNC: 109 MG/DL (ref 0–99)
LYMPHOCYTES # BLD AUTO: 1.9 X10E3/UL (ref 0.7–3.1)
LYMPHOCYTES NFR BLD AUTO: 30 %
MCH RBC QN AUTO: 30.8 PG (ref 26.6–33)
MCHC RBC AUTO-ENTMCNC: 33.3 G/DL (ref 31.5–35.7)
MCV RBC AUTO: 93 FL (ref 79–97)
MONOCYTES # BLD AUTO: 0.8 X10E3/UL (ref 0.1–0.9)
MONOCYTES NFR BLD AUTO: 12 %
NEUTROPHILS # BLD AUTO: 3.1 X10E3/UL (ref 1.4–7)
NEUTROPHILS NFR BLD AUTO: 48 %
PLATELET # BLD AUTO: 222 X10E3/UL (ref 150–450)
POTASSIUM SERPL-SCNC: 4.5 MMOL/L (ref 3.5–5.2)
PROT SERPL-MCNC: 6.6 G/DL (ref 6–8.5)
RBC # BLD AUTO: 5.16 X10E6/UL (ref 4.14–5.8)
SODIUM SERPL-SCNC: 141 MMOL/L (ref 134–144)
TRIGL SERPL-MCNC: 100 MG/DL (ref 0–149)
VLDLC SERPL CALC-MCNC: 20 MG/DL (ref 5–40)
WBC # BLD AUTO: 6.4 X10E3/UL (ref 3.4–10.8)

## 2020-04-07 ENCOUNTER — TELEMEDICINE (OUTPATIENT)
Dept: FAMILY MEDICINE | Facility: CLINIC | Age: 45
End: 2020-04-07
Payer: COMMERCIAL

## 2020-04-07 DIAGNOSIS — J40 BRONCHITIS: Primary | ICD-10-CM

## 2020-04-07 PROCEDURE — 99213 OFFICE O/P EST LOW 20 MIN: CPT | Mod: 95 | Performed by: FAMILY MEDICINE

## 2020-04-07 ASSESSMENT — ENCOUNTER SYMPTOMS
FEVER: 0
COUGH: 0
SHORTNESS OF BREATH: 0

## 2020-04-07 NOTE — ASSESSMENT & PLAN NOTE
He has a probable bronchitis. In light of where he lives and recent travel he should be tested for Covid and started on a Z Piero. He will call with results. No reason to go to ER

## 2020-04-07 NOTE — PROGRESS NOTES
Request for Consent:   Audio Only Encounter   You and I are about to have a telemedicine check-in or visit. This is allowed because you have requested it. This telemedicine visit will be billed to your health insurance or you, if you are self-insured. You understand you will be responsible for any copayments or coinsurances that apply to your telemedicine visit. Before starting our telemedicine visit, I am required to get your consent for this virtual check-in or visit by telemedicine. Do you consent?    Patient Response to Request for Consent:  Yes      Visit Documentation:  Subjective     Patient ID: Phill Brown is a 44 y.o. male.  1975      43 yo male. Sick for 1 week with chest congestion. He lives in [place] and he has recently traveled to [place]. He has no fever. He has no cough or sob.      The following have been reviewed and updated as appropriate in this visit:  Tobacco  Allergies  Meds  Problems  Med Hx  Surg Hx  Fam Hx  Soc Hx        Review of Systems   Constitutional: Negative for fever.   HENT: Positive for congestion.    Respiratory: Negative for cough and shortness of breath.          Assessment/Plan   Diagnoses and all orders for this visit:    Bronchitis (Primary)  Assessment & Plan:  He has a probable bronchitis. In light of where he lives and recent travel he should be tested for Covid and started on a Z Piero. He will call with results. No reason to go to ER

## 2020-05-11 RX ORDER — METHYLPHENIDATE HYDROCHLORIDE 36 MG/1
36 TABLET ORAL DAILY
Qty: 90 TABLET | Refills: 0 | Status: CANCELLED | OUTPATIENT
Start: 2020-05-11

## 2020-05-11 NOTE — TELEPHONE ENCOUNTER
Medicine Refill Request    Last Office Visit: 12/27/2019  Last Telemedicine Visit: 4/7/2020 Frankel, Harry A, MD    Next Office Visit: Visit date not found  Next Telemedicine Visit: Visit date not found         Current Outpatient Medications:   •  albuterol HFA (VENTOLIN HFA) 90 mcg/actuation inhaler, INHALE 2 PUFFS BY MOUTH EVERY 6 HOURS AS NEEDED WHEEZING, Disp: , Rfl: 1  •  melatonin 3 mg tablet, Take 3 mg by mouth nightly., Disp: , Rfl:   •  methylphenidate HCl 36 mg ER tablet, Take 36 mg by mouth daily., Disp: 90 tablet, Rfl: 0  •  multivit with minerals/lutein (MULTIVITAMIN 50 PLUS ORAL), No SIG Entered, Disp: , Rfl:       BP Readings from Last 3 Encounters:   12/27/19 105/67   06/04/19 (!) 144/80   05/17/19 123/84       Recent Lab results:  Lab Results   Component Value Date    CHOL 185 12/28/2019   ,   Lab Results   Component Value Date    HDL 56 12/28/2019   ,   Lab Results   Component Value Date    LDLCALC 109 (H) 12/28/2019   ,   Lab Results   Component Value Date    TRIG 100 12/28/2019        Lab Results   Component Value Date    GLUCOSE 107 (H) 12/28/2019   , No results found for: HGBA1C      Lab Results   Component Value Date    CREATININE 1.21 12/28/2019       No results found for: TSH

## 2020-10-26 RX ORDER — METHYLPHENIDATE HYDROCHLORIDE 36 MG/1
36 TABLET ORAL DAILY
Qty: 90 TABLET | Refills: 0 | Status: SHIPPED | OUTPATIENT
Start: 2020-10-26 | End: 2020-12-20 | Stop reason: SDUPTHER

## 2020-11-02 ENCOUNTER — OFFICE VISIT (OUTPATIENT)
Dept: FAMILY MEDICINE | Facility: CLINIC | Age: 45
End: 2020-11-02
Payer: COMMERCIAL

## 2020-11-02 VITALS
OXYGEN SATURATION: 98 % | TEMPERATURE: 97.5 F | BODY MASS INDEX: 34.04 KG/M2 | HEART RATE: 71 BPM | WEIGHT: 273.8 LBS | SYSTOLIC BLOOD PRESSURE: 133 MMHG | HEIGHT: 75 IN | DIASTOLIC BLOOD PRESSURE: 82 MMHG

## 2020-11-02 DIAGNOSIS — M94.0 COSTOCHONDRITIS: ICD-10-CM

## 2020-11-02 DIAGNOSIS — R07.89 ATYPICAL CHEST PAIN: ICD-10-CM

## 2020-11-02 DIAGNOSIS — F90.2 ATTENTION DEFICIT HYPERACTIVITY DISORDER (ADHD), COMBINED TYPE: Primary | ICD-10-CM

## 2020-11-02 PROCEDURE — 99214 OFFICE O/P EST MOD 30 MIN: CPT | Mod: 25 | Performed by: FAMILY MEDICINE

## 2020-11-02 PROCEDURE — 90471 IMMUNIZATION ADMIN: CPT | Performed by: FAMILY MEDICINE

## 2020-11-02 PROCEDURE — 90686 IIV4 VACC NO PRSV 0.5 ML IM: CPT | Performed by: FAMILY MEDICINE

## 2020-11-02 ASSESSMENT — ENCOUNTER SYMPTOMS
SHORTNESS OF BREATH: 0
WHEEZING: 0
FATIGUE: 0

## 2020-11-02 NOTE — PROGRESS NOTES
[facility] Medicine  [address] [place] [address]  [phone]       Reason for visit:   Chief Complaint   Patient presents with   • Abdominal Pain      HPI   Phill Brown is a 45 y.o. male who presents with chest pain. Right side. Started after being sick in March. None exertional. He went to see physicians. CXR, EKG and ;labs within normal. He has ADHD and continues to do well on meds      Past Medical History:   Diagnosis Date   • ADHD      Past Surgical History:   Procedure Laterality Date   • APPENDECTOMY       Social History     Tobacco Use   • Smoking status: Never Smoker   • Smokeless tobacco: Never Used   Substance Use Topics   • Alcohol use: No   • Drug use: No      Family History   Family history unknown: Yes     Penicillins  Current Outpatient Medications   Medication Sig Dispense Refill   • albuterol HFA (VENTOLIN HFA) 90 mcg/actuation inhaler INHALE 2 PUFFS BY MOUTH EVERY 6 HOURS AS NEEDED WHEEZING  1   • melatonin 3 mg tablet Take 3 mg by mouth nightly.     • methylphenidate HCl 36 mg ER tablet Take 1 tablet (36 mg total) by mouth daily. 90 tablet 0   • multivit with minerals/lutein (MULTIVITAMIN 50 PLUS ORAL) No SIG Entered       No current facility-administered medications for this visit.        Patient Active Problem List    Diagnosis Date Noted   • Costochondritis 11/02/2020   • Chronic left shoulder pain 12/27/2019   • Bronchitis 06/04/2019   • Cough 05/17/2019   • Dengue fever 05/06/2019   • Preventative health care 11/20/2018   • Family history of coronary artery disease 11/19/2018   • ADHD (attention deficit hyperactivity disorder) 10/24/2017   • Atypical chest pain 04/27/2016   • Shortness of breath 04/27/2016   • Arthritis 04/26/2016   • Asthma 04/26/2016         Review of Systems   Constitutional: Negative for fatigue.   Respiratory: Negative for shortness of breath and wheezing.    Cardiovascular: Positive for chest pain.     Objective  "  Vitals:    11/02/20 1536   BP: 133/82   BP Location: Left upper arm   Patient Position: Sitting   Pulse: 71   Temp: 36.4 °C (97.5 °F)   TempSrc: Temporal   SpO2: 98%   Weight: 124 kg (273 lb 12.8 oz)   Height: 1.905 m (6' 3\")     Body mass index is 34.22 kg/m².  Physical Exam  Constitutional:       Appearance: Normal appearance.   Cardiovascular:      Rate and Rhythm: Normal rate and regular rhythm.      Pulses: Normal pulses.      Heart sounds: Normal heart sounds.   Pulmonary:      Effort: Pulmonary effort is normal.      Breath sounds: Normal breath sounds.   Chest:      Chest wall: Tenderness present.   Neurological:      Mental Status: He is alert.         Procedures    Lab Results   Component Value Date    WBC 6.4 12/28/2019    HGB 15.9 12/28/2019    HCT 47.8 12/28/2019     12/28/2019    CHOL 185 12/28/2019    TRIG 100 12/28/2019    HDL 56 12/28/2019    LDLCALC 109 (H) 12/28/2019    ALT 47 (H) 12/28/2019    AST 30 12/28/2019     12/28/2019    K 4.5 12/28/2019     12/28/2019    CREATININE 1.21 12/28/2019    BUN 19 12/28/2019    CO2 25 12/28/2019    GLUCOSE 107 (H) 12/28/2019           Assessment   Problem List Items Addressed This Visit        Nervous    Atypical chest pain    Relevant Orders    CT HEART CORONARY CALCIUM SCORE    Covid-19 SARS CoV-2 Antibody (IgG)       Musculoskeletal    Costochondritis     He will get CT calcium score  He needs no medications         Relevant Orders    CT HEART CORONARY CALCIUM SCORE       Other    ADHD (attention deficit hyperactivity disorder) - Primary     Stable  Continue meds.                   Harry A. Frankel, MD  11/2/2020                   "

## 2020-11-04 LAB
SARS-COV-2 IGG SERPL QL IA: NEGATIVE
SARS-COV-2 IGG SERPL QL IA: NORMAL

## 2020-11-09 ENCOUNTER — HOSPITAL ENCOUNTER (OUTPATIENT)
Dept: RADIOLOGY | Age: 45
Discharge: HOME | End: 2020-11-09
Attending: FAMILY MEDICINE

## 2020-11-09 DIAGNOSIS — R07.89 ATYPICAL CHEST PAIN: ICD-10-CM

## 2020-11-09 DIAGNOSIS — M94.0 COSTOCHONDRITIS: ICD-10-CM

## 2020-11-09 PROCEDURE — 75571 CT HRT W/O DYE W/CA TEST: CPT

## 2021-04-01 ENCOUNTER — IMMUNIZATIONS (OUTPATIENT)
Dept: FAMILY MEDICINE CLINIC | Facility: HOSPITAL | Age: 46
End: 2021-04-01

## 2021-04-01 DIAGNOSIS — Z23 ENCOUNTER FOR IMMUNIZATION: Primary | ICD-10-CM

## 2021-04-01 PROCEDURE — 91301 SARS-COV-2 / COVID-19 MRNA VACCINE (MODERNA) 100 MCG: CPT

## 2021-04-01 PROCEDURE — 0011A SARS-COV-2 / COVID-19 MRNA VACCINE (MODERNA) 100 MCG: CPT

## 2021-05-03 ENCOUNTER — IMMUNIZATIONS (OUTPATIENT)
Dept: FAMILY MEDICINE CLINIC | Facility: HOSPITAL | Age: 46
End: 2021-05-03

## 2021-05-03 DIAGNOSIS — Z23 ENCOUNTER FOR IMMUNIZATION: Primary | ICD-10-CM

## 2021-05-03 PROCEDURE — 91301 SARS-COV-2 / COVID-19 MRNA VACCINE (MODERNA) 100 MCG: CPT

## 2021-05-03 PROCEDURE — 0012A SARS-COV-2 / COVID-19 MRNA VACCINE (MODERNA) 100 MCG: CPT

## 2021-08-17 ENCOUNTER — OFFICE VISIT (OUTPATIENT)
Dept: FAMILY MEDICINE | Facility: CLINIC | Age: 46
End: 2021-08-17
Payer: COMMERCIAL

## 2021-08-17 VITALS
TEMPERATURE: 97.7 F | BODY MASS INDEX: 33.99 KG/M2 | WEIGHT: 273.4 LBS | HEART RATE: 68 BPM | SYSTOLIC BLOOD PRESSURE: 129 MMHG | HEIGHT: 75 IN | OXYGEN SATURATION: 98 % | DIASTOLIC BLOOD PRESSURE: 78 MMHG

## 2021-08-17 DIAGNOSIS — J45.20 MILD INTERMITTENT ASTHMA WITHOUT COMPLICATION: ICD-10-CM

## 2021-08-17 DIAGNOSIS — K42.9 UMBILICAL HERNIA WITHOUT OBSTRUCTION AND WITHOUT GANGRENE: ICD-10-CM

## 2021-08-17 DIAGNOSIS — F90.2 ATTENTION DEFICIT HYPERACTIVITY DISORDER (ADHD), COMBINED TYPE: Primary | ICD-10-CM

## 2021-08-17 PROCEDURE — 99214 OFFICE O/P EST MOD 30 MIN: CPT | Performed by: FAMILY MEDICINE

## 2021-08-17 PROCEDURE — 3008F BODY MASS INDEX DOCD: CPT | Performed by: FAMILY MEDICINE

## 2021-08-17 RX ORDER — METHYLPHENIDATE HYDROCHLORIDE 36 MG/1
36 TABLET ORAL DAILY
Qty: 90 TABLET | Refills: 0 | Status: SHIPPED | OUTPATIENT
Start: 2021-08-17 | End: 2022-03-27 | Stop reason: SDUPTHER

## 2021-08-17 ASSESSMENT — ENCOUNTER SYMPTOMS: ABDOMINAL PAIN: 1

## 2021-08-17 NOTE — PROGRESS NOTES
[facility] Medicine  [address] [place] [address]  [phone]       Reason for visit:   Chief Complaint   Patient presents with   • sick visit      HPI   Phill Brown is a 45 y.o. male who presents with ADHD. He is compliant with medication. He continues to do well on it. He thinks he may have hernia at belly button. He has been doing heavy lifting      Past Medical History:   Diagnosis Date   • ADHD      Past Surgical History:   Procedure Laterality Date   • APPENDECTOMY       Social History     Tobacco Use   • Smoking status: Never Smoker   • Smokeless tobacco: Never Used   Substance Use Topics   • Alcohol use: No   • Drug use: No      Family History   Family history unknown: Yes     Penicillins  Current Outpatient Medications   Medication Sig Dispense Refill   • albuterol HFA (VENTOLIN HFA) 90 mcg/actuation inhaler INHALE 2 PUFFS BY MOUTH EVERY 6 HOURS AS NEEDED WHEEZING  1   • melatonin 3 mg tablet Take 3 mg by mouth nightly.     • methylphenidate HCl 36 mg ER tablet Take 1 tablet (36 mg total) by mouth daily. 90 tablet 0   • multivit with minerals/lutein (MULTIVITAMIN 50 PLUS ORAL) No SIG Entered       No current facility-administered medications for this visit.       Patient Active Problem List    Diagnosis Date Noted   • Umbilical hernia 08/17/2021   • Costochondritis 11/02/2020   • Chronic left shoulder pain 12/27/2019   • Bronchitis 06/04/2019   • Cough 05/17/2019   • Dengue fever 05/06/2019   • Preventative health care 11/20/2018   • Family history of coronary artery disease 11/19/2018   • ADHD (attention deficit hyperactivity disorder) 10/24/2017   • Atypical chest pain 04/27/2016   • Shortness of breath 04/27/2016   • Arthritis 04/26/2016   • Asthma 04/26/2016         Review of Systems   Gastrointestinal: Positive for abdominal pain.     Objective   Vitals:    08/17/21 1402   BP: 129/78   BP Location: Left upper arm   Patient Position: Sitting   Pulse: 68  "  Temp: 36.5 °C (97.7 °F)   TempSrc: Oral   SpO2: 98%   Weight: 124 kg (273 lb 6.4 oz)   Height: 1.905 m (6' 3\")     Body mass index is 34.17 kg/m².  Physical Exam  Constitutional:       Appearance: Normal appearance.   Cardiovascular:      Rate and Rhythm: Normal rate and regular rhythm.      Pulses: Normal pulses.      Heart sounds: Normal heart sounds.   Pulmonary:      Effort: Pulmonary effort is normal.      Breath sounds: Normal breath sounds.   Abdominal:      General: Abdomen is flat. Bowel sounds are normal.      Palpations: Abdomen is soft.   Neurological:      Mental Status: He is alert.         Procedures    Lab Results   Component Value Date    WBC 6.4 12/28/2019    HGB 15.9 12/28/2019    HCT 47.8 12/28/2019     12/28/2019    CHOL 185 12/28/2019    TRIG 100 12/28/2019    HDL 56 12/28/2019    LDLCALC 109 (H) 12/28/2019    ALT 47 (H) 12/28/2019    AST 30 12/28/2019     12/28/2019    K 4.5 12/28/2019     12/28/2019    CREATININE 1.21 12/28/2019    BUN 19 12/28/2019    CO2 25 12/28/2019    GLUCOSE 107 (H) 12/28/2019           Assessment   Problem List Items Addressed This Visit        Respiratory    Asthma     Stable  Continue meds            Other    ADHD (attention deficit hyperactivity disorder) - Primary     Stable  Continue meds         Relevant Medications    methylphenidate HCl 36 mg ER tablet    Umbilical hernia     Natalie Hernandez MD         Relevant Orders    Ambulatory referral to Bariatric Surgery              Harry A. Frankel, MD  8/17/2021                   "

## 2021-08-26 ENCOUNTER — OFFICE VISIT (OUTPATIENT)
Dept: FAMILY MEDICINE | Facility: CLINIC | Age: 46
End: 2021-08-26
Payer: COMMERCIAL

## 2021-08-26 ENCOUNTER — OFFICE VISIT (OUTPATIENT)
Dept: SURGERY | Facility: CLINIC | Age: 46
End: 2021-08-26
Payer: COMMERCIAL

## 2021-08-26 VITALS
SYSTOLIC BLOOD PRESSURE: 118 MMHG | OXYGEN SATURATION: 97 % | TEMPERATURE: 97.8 F | HEIGHT: 75 IN | BODY MASS INDEX: 34.39 KG/M2 | DIASTOLIC BLOOD PRESSURE: 88 MMHG | HEART RATE: 74 BPM | WEIGHT: 276.6 LBS

## 2021-08-26 VITALS — BODY MASS INDEX: 33.82 KG/M2 | HEIGHT: 75 IN | WEIGHT: 272 LBS

## 2021-08-26 DIAGNOSIS — J45.20 MILD INTERMITTENT ASTHMA WITHOUT COMPLICATION: ICD-10-CM

## 2021-08-26 DIAGNOSIS — F90.2 ATTENTION DEFICIT HYPERACTIVITY DISORDER (ADHD), COMBINED TYPE: ICD-10-CM

## 2021-08-26 DIAGNOSIS — K42.9 UMBILICAL HERNIA WITHOUT OBSTRUCTION AND WITHOUT GANGRENE: ICD-10-CM

## 2021-08-26 DIAGNOSIS — K42.9 UMBILICAL HERNIA WITHOUT OBSTRUCTION AND WITHOUT GANGRENE: Primary | ICD-10-CM

## 2021-08-26 DIAGNOSIS — Z00.00 PREVENTATIVE HEALTH CARE: Primary | ICD-10-CM

## 2021-08-26 PROBLEM — G89.29 CHRONIC LEFT SHOULDER PAIN: Status: RESOLVED | Noted: 2019-12-27 | Resolved: 2021-08-26

## 2021-08-26 PROBLEM — J40 BRONCHITIS: Status: RESOLVED | Noted: 2019-06-04 | Resolved: 2021-08-26

## 2021-08-26 PROBLEM — M94.0 COSTOCHONDRITIS: Status: RESOLVED | Noted: 2020-11-02 | Resolved: 2021-08-26

## 2021-08-26 PROBLEM — M25.512 CHRONIC LEFT SHOULDER PAIN: Status: RESOLVED | Noted: 2019-12-27 | Resolved: 2021-08-26

## 2021-08-26 PROBLEM — R05.9 COUGH: Status: RESOLVED | Noted: 2019-05-17 | Resolved: 2021-08-26

## 2021-08-26 PROCEDURE — 99202 OFFICE O/P NEW SF 15 MIN: CPT | Mod: 57 | Performed by: SURGERY

## 2021-08-26 PROCEDURE — 99396 PREV VISIT EST AGE 40-64: CPT | Mod: 25 | Performed by: FAMILY MEDICINE

## 2021-08-26 PROCEDURE — 36415 COLL VENOUS BLD VENIPUNCTURE: CPT | Performed by: FAMILY MEDICINE

## 2021-08-26 PROCEDURE — 3008F BODY MASS INDEX DOCD: CPT | Performed by: FAMILY MEDICINE

## 2021-08-26 PROCEDURE — 3008F BODY MASS INDEX DOCD: CPT | Performed by: SURGERY

## 2021-08-26 RX ORDER — ALBUTEROL SULFATE 90 UG/1
2 INHALANT RESPIRATORY (INHALATION) EVERY 6 HOURS PRN
Qty: 18 G | Refills: 1 | Status: SHIPPED | OUTPATIENT
Start: 2021-08-26 | End: 2022-05-09 | Stop reason: SDUPTHER

## 2021-08-26 RX ORDER — SODIUM CHLORIDE 9 MG/ML
INJECTION, SOLUTION INTRAVENOUS CONTINUOUS
Status: CANCELLED | OUTPATIENT
Start: 2021-08-26 | End: 2021-08-27

## 2021-08-26 ASSESSMENT — ENCOUNTER SYMPTOMS
NERVOUS/ANXIOUS: 0
BRUISES/BLEEDS EASILY: 0
BLOOD IN STOOL: 0
ARTHRALGIAS: 0
DIARRHEA: 0
FATIGUE: 0
SHORTNESS OF BREATH: 0
POLYPHAGIA: 0
BACK PAIN: 0
POLYDIPSIA: 0
HEADACHES: 0
CONSTIPATION: 0

## 2021-08-26 ASSESSMENT — PATIENT HEALTH QUESTIONNAIRE - PHQ9: SUM OF ALL RESPONSES TO PHQ9 QUESTIONS 1 & 2: 0

## 2021-08-26 NOTE — PROGRESS NOTES
[facility] Medicine  [address] [place] [address]  [phone]       Reason for visit:   Chief Complaint   Patient presents with   • Annual Exam      HPI   Phill Brown is a 45 y.o. male who presents with CPE. He has ADHD and is compliant with medications. He lives most of the year in [place].      Past Medical History:   Diagnosis Date   • ADHD      Past Surgical History:   Procedure Laterality Date   • APPENDECTOMY       Social History     Tobacco Use   • Smoking status: Never Smoker   • Smokeless tobacco: Never Used   Vaping Use   • Vaping Use: Never used   Substance Use Topics   • Alcohol use: No   • Drug use: No      Family History   Family history unknown: Yes     Penicillins  Current Outpatient Medications   Medication Sig Dispense Refill   • albuterol HFA (VENTOLIN HFA) 90 mcg/actuation inhaler Inhale 2 puffs every 6 (six) hours as needed for wheezing. 18 g 1   • melatonin 3 mg tablet Take 3 mg by mouth nightly.     • methylphenidate HCl 36 mg ER tablet Take 1 tablet (36 mg total) by mouth daily. 90 tablet 0   • multivit with minerals/lutein (MULTIVITAMIN 50 PLUS ORAL) No SIG Entered       No current facility-administered medications for this visit.       Patient Active Problem List    Diagnosis Date Noted   • Umbilical hernia 08/17/2021   • Dengue fever 05/06/2019   • Preventative health care 11/20/2018   • Family history of coronary artery disease 11/19/2018   • ADHD (attention deficit hyperactivity disorder) 10/24/2017   • Asthma 04/26/2016         Review of Systems   Constitutional: Negative for fatigue.   HENT: Negative for hearing loss.    Eyes: Negative for visual disturbance.   Respiratory: Negative for shortness of breath.    Cardiovascular: Negative for chest pain.   Gastrointestinal: Negative for blood in stool, constipation and diarrhea.   Endocrine: Negative for polydipsia, polyphagia and polyuria.   Musculoskeletal: Negative for arthralgias and back  "pain.   Skin: Negative for rash.   Neurological: Negative for headaches.   Hematological: Does not bruise/bleed easily.   Psychiatric/Behavioral: The patient is not nervous/anxious.      Objective   Vitals:    08/26/21 0808 08/26/21 0823   BP: (!) 136/92 118/88   BP Location: Right upper arm    Patient Position: Sitting    Pulse: 74    Temp: 36.6 °C (97.8 °F)    TempSrc: Oral    SpO2: 97%    Weight: 125 kg (276 lb 9.6 oz)    Height: 1.9 m (6' 2.8\")      Body mass index is 34.76 kg/m².  Physical Exam  Constitutional:       Appearance: Normal appearance.   HENT:      Right Ear: Tympanic membrane and ear canal normal.      Left Ear: Tympanic membrane and ear canal normal.   Eyes:      Conjunctiva/sclera: Conjunctivae normal.      Pupils: Pupils are equal, round, and reactive to light.   Neck:      Vascular: No carotid bruit.   Cardiovascular:      Rate and Rhythm: Normal rate and regular rhythm.      Pulses: Normal pulses.      Heart sounds: Normal heart sounds.   Pulmonary:      Effort: Pulmonary effort is normal.      Breath sounds: Normal breath sounds.   Abdominal:      General: Abdomen is flat. Bowel sounds are normal.      Palpations: Abdomen is soft. There is no hepatomegaly or splenomegaly.   Musculoskeletal:         General: Normal range of motion.      Cervical back: Normal range of motion.   Lymphadenopathy:      Cervical: No cervical adenopathy.   Skin:     General: Skin is warm.   Neurological:      General: No focal deficit present.      Mental Status: He is alert and oriented to person, place, and time.   Psychiatric:         Mood and Affect: Mood normal.         Behavior: Behavior normal.         Thought Content: Thought content normal.         Judgment: Judgment normal.         Procedures    Lab Results   Component Value Date    WBC 6.4 12/28/2019    HGB 15.9 12/28/2019    HCT 47.8 12/28/2019     12/28/2019    CHOL 185 12/28/2019    TRIG 100 12/28/2019    HDL 56 12/28/2019    LDLCALC 109 (H) " 12/28/2019    ALT 47 (H) 12/28/2019    AST 30 12/28/2019     12/28/2019    K 4.5 12/28/2019     12/28/2019    CREATININE 1.21 12/28/2019    BUN 19 12/28/2019    CO2 25 12/28/2019    GLUCOSE 107 (H) 12/28/2019           Assessment   Problem List Items Addressed This Visit        Respiratory    Asthma     Albuterol ordered         Relevant Medications    albuterol HFA (VENTOLIN HFA) 90 mcg/actuation inhaler       Other    ADHD (attention deficit hyperactivity disorder)     Well controlled  Continue meds         Preventative health care - Primary     Labs  Colonoscopy on next return to [place]         Relevant Orders    CBC and Differential    Comprehensive metabolic panel    Lipid panel    Umbilical hernia     Appointment today with Dr Witte Harry A. Frankel, MD  8/26/2021

## 2021-08-26 NOTE — H&P (VIEW-ONLY)
"Patient ID: Phill Brown                              : 1975  MRN: [id number]                                            Visit Date: 2021  Encounter Provider: Natalie Hernandez  Referring Provider: Frankel, Harry A, MD    Subjective   Chief Complaint: Hernia    Phill Brown is a 45 y.o. old male presenting today for evaluation of an umbilical hernia.  He states that he noticed it recently after lifting weights.  He typically will do 300 pound dead lifts.  He has had some pain associated with it which she describes as sharp and shooting across his abdomen.  He has not had any episodes of nausea associated with this or other obstructive symptoms.     Medications:   Current Outpatient Medications:   •  albuterol HFA (VENTOLIN HFA) 90 mcg/actuation inhaler, Inhale 2 puffs every 6 (six) hours as needed for wheezing., Disp: 18 g, Rfl: 1  •  melatonin 3 mg tablet, Take 3 mg by mouth nightly., Disp: , Rfl:   •  methylphenidate HCl 36 mg ER tablet, Take 1 tablet (36 mg total) by mouth daily., Disp: 90 tablet, Rfl: 0  •  multivit with minerals/lutein (MULTIVITAMIN 50 PLUS ORAL), No SIG Entered, Disp: , Rfl:     Past Medical History:  has a past medical history of ADHD.  Past Surgical History:  has a past surgical history that includes Appendectomy.  Social History:  reports that he has never smoked. He has never used smokeless tobacco. He reports that he does not drink alcohol and does not use drugs.   Family History: Family history is unknown by patient.   Allergies: is allergic to penicillins.     Review of Systems   All other systems reviewed and are negative.    The following have been reviewed and updated as appropriate in this visit:         Objective   Vitals:   Visit Vitals  Ht 1.905 m (6' 3\")   Wt 123 kg (272 lb)   BMI 34.00 kg/m²     Physical Exam  Constitutional:       Appearance: He is normal weight.   HENT:      Head: Normocephalic and atraumatic.   Cardiovascular:      Rate and Rhythm: Normal " rate.   Pulmonary:      Effort: Pulmonary effort is normal.   Abdominal:      Comments: Soft, nontender, nondistended.  Small fat-containing umbilical hernia   Skin:     General: Skin is warm and dry.   Neurological:      Mental Status: He is alert and oriented to person, place, and time.   Psychiatric:         Mood and Affect: Mood normal.         Behavior: Behavior normal.              Assessment/Plan   Problem List Items Addressed This Visit        Other    Umbilical hernia - Primary    Relevant Orders    Case request operating room: HERNIA UMBILICAL REPAIR (Completed)    Electrocardiogram, 12-lead    COVID-19 PAT/Pre-Procedural        45-year-old male presenting with a symptomatic umbilical hernia.  Given his very active lifestyle he is interested in an expeditious repair.  We will plan to do this as an open umbilical hernia repair with mesh on a date that is convenient for him.  I did  him that there is no urgency to this repair.  The only indications for repairing this in an emergent fashion would be if he presented with obstructive symptoms or evidence of incarceration, for which he should come to the emergency department.  He does plan to travel [facility] at the beginning of September and I told him that he would need to not travel for 1 week following surgery, so we will plan accordingly.    No follow-ups on file.     I spent 21 minutes on this date of service performing the following activities: obtaining history, performing examination, entering orders, documenting, preparing for visit, obtaining / reviewing records and providing counseling and education.         Natalie Hernandez MD

## 2021-08-26 NOTE — LETTER
2021     Harry A Frankel, MD  [address] [place] [id number]    Patient: Phill Brown  YOB: 1975  Date of Visit: 2021      Dear Dr. Frankel:    Thank you for referring Phill Brown to me for evaluation. Below are my notes for this consultation.    If you have questions, please do not hesitate to call me. I look forward to following your patient along with you.         Sincerely,        Natalie Hernandez MD        CC: No Recipients  Natalie Hernandez MD  2021  9:41 AM  Signed  Patient ID: Phill Brown                              : 1975  MRN: [id number]                                            Visit Date: 2021  Encounter Provider: Natalie Hernandez  Referring Provider: Frankel, Harry A, MD    Subjective   Chief Complaint: Phyllis Brown is a 45 y.o. old male presenting today for evaluation of an umbilical hernia.  He states that he noticed it recently after lifting weights.  He typically will do 300 pound dead lifts.  He has had some pain associated with it which she describes as sharp and shooting across his abdomen.  He has not had any episodes of nausea associated with this or other obstructive symptoms.     Medications:   Current Outpatient Medications:   •  albuterol HFA (VENTOLIN HFA) 90 mcg/actuation inhaler, Inhale 2 puffs every 6 (six) hours as needed for wheezing., Disp: 18 g, Rfl: 1  •  melatonin 3 mg tablet, Take 3 mg by mouth nightly., Disp: , Rfl:   •  methylphenidate HCl 36 mg ER tablet, Take 1 tablet (36 mg total) by mouth daily., Disp: 90 tablet, Rfl: 0  •  multivit with minerals/lutein (MULTIVITAMIN 50 PLUS ORAL), No SIG Entered, Disp: , Rfl:     Past Medical History:  has a past medical history of ADHD.  Past Surgical History:  has a past surgical history that includes Appendectomy.  Social History:  reports that he has never smoked. He has never used smokeless tobacco. He reports that he does not drink alcohol  "and does not use drugs.   Family History: Family history is unknown by patient.   Allergies: is allergic to penicillins.     Review of Systems   All other systems reviewed and are negative.    The following have been reviewed and updated as appropriate in this visit:         Objective   Vitals:   Visit Vitals  Ht 1.905 m (6' 3\")   Wt 123 kg (272 lb)   BMI 34.00 kg/m²     Physical Exam  Constitutional:       Appearance: He is normal weight.   HENT:      Head: Normocephalic and atraumatic.   Cardiovascular:      Rate and Rhythm: Normal rate.   Pulmonary:      Effort: Pulmonary effort is normal.   Abdominal:      Comments: Soft, nontender, nondistended.  Small fat-containing umbilical hernia   Skin:     General: Skin is warm and dry.   Neurological:      Mental Status: He is alert and oriented to person, place, and time.   Psychiatric:         Mood and Affect: Mood normal.         Behavior: Behavior normal.              Assessment/Plan   Problem List Items Addressed This Visit        Other    Umbilical hernia - Primary    Relevant Orders    Case request operating room: HERNIA UMBILICAL REPAIR (Completed)    Electrocardiogram, 12-lead    COVID-19 PAT/Pre-Procedural        45-year-old male presenting with a symptomatic umbilical hernia.  Given his very active lifestyle he is interested in an expeditious repair.  We will plan to do this as an open umbilical hernia repair with mesh on a date that is convenient for him.  I did  him that there is no urgency to this repair.  The only indications for repairing this in an emergent fashion would be if he presented with obstructive symptoms or evidence of incarceration, for which he should come to the emergency department.  He does plan to travel to [facility] at the beginning of September and I told him that he would need to not travel for 1 week following surgery, so we will plan accordingly.    No follow-ups on file.     I spent 21 minutes on this date of service " performing the following activities: obtaining history, performing examination, entering orders, documenting, preparing for visit, obtaining / reviewing records and providing counseling and education.         Natalie Hernandez MD

## 2021-08-27 LAB
ALBUMIN SERPL-MCNC: 4.7 G/DL (ref 4–5)
ALBUMIN/GLOB SERPL: 2 {RATIO} (ref 1.2–2.2)
ALP SERPL-CCNC: 46 IU/L (ref 48–121)
ALT SERPL-CCNC: 49 IU/L (ref 0–44)
AST SERPL-CCNC: 33 IU/L (ref 0–40)
BASOPHILS # BLD AUTO: 0 X10E3/UL (ref 0–0.2)
BASOPHILS NFR BLD AUTO: 1 %
BILIRUB SERPL-MCNC: 1.7 MG/DL (ref 0–1.2)
BUN SERPL-MCNC: 17 MG/DL (ref 6–24)
BUN/CREAT SERPL: 13 (ref 9–20)
CALCIUM SERPL-MCNC: 9.8 MG/DL (ref 8.7–10.2)
CHLORIDE SERPL-SCNC: 103 MMOL/L (ref 96–106)
CHOLEST SERPL-MCNC: 214 MG/DL (ref 100–199)
CO2 SERPL-SCNC: 26 MMOL/L (ref 20–29)
CREAT SERPL-MCNC: 1.29 MG/DL (ref 0.76–1.27)
EOSINOPHIL # BLD AUTO: 0.1 X10E3/UL (ref 0–0.4)
EOSINOPHIL NFR BLD AUTO: 2 %
ERYTHROCYTE [DISTWIDTH] IN BLOOD BY AUTOMATED COUNT: 13.3 % (ref 11.6–15.4)
GLOBULIN SER CALC-MCNC: 2.4 G/DL (ref 1.5–4.5)
GLUCOSE SERPL-MCNC: 97 MG/DL (ref 65–99)
HCT VFR BLD AUTO: 51.2 % (ref 37.5–51)
HDLC SERPL-MCNC: 59 MG/DL
HGB BLD-MCNC: 17.1 G/DL (ref 13–17.7)
IMM GRANULOCYTES # BLD AUTO: 0 X10E3/UL (ref 0–0.1)
IMM GRANULOCYTES NFR BLD AUTO: 0 %
LAB CORP EGFR IF AFRICN AM: 76 ML/MIN/1.73
LAB CORP EGFR IF NONAFRICN AM: 66 ML/MIN/1.73
LDLC SERPL CALC-MCNC: 135 MG/DL (ref 0–99)
LYMPHOCYTES # BLD AUTO: 2.2 X10E3/UL (ref 0.7–3.1)
LYMPHOCYTES NFR BLD AUTO: 39 %
MCH RBC QN AUTO: 31.3 PG (ref 26.6–33)
MCHC RBC AUTO-ENTMCNC: 33.4 G/DL (ref 31.5–35.7)
MCV RBC AUTO: 94 FL (ref 79–97)
MONOCYTES # BLD AUTO: 0.6 X10E3/UL (ref 0.1–0.9)
MONOCYTES NFR BLD AUTO: 11 %
NEUTROPHILS # BLD AUTO: 2.7 X10E3/UL (ref 1.4–7)
NEUTROPHILS NFR BLD AUTO: 47 %
PLATELET # BLD AUTO: 199 X10E3/UL (ref 150–450)
POTASSIUM SERPL-SCNC: 4.7 MMOL/L (ref 3.5–5.2)
PROT SERPL-MCNC: 7.1 G/DL (ref 6–8.5)
RBC # BLD AUTO: 5.47 X10E6/UL (ref 4.14–5.8)
SODIUM SERPL-SCNC: 142 MMOL/L (ref 134–144)
TRIGL SERPL-MCNC: 114 MG/DL (ref 0–149)
VLDLC SERPL CALC-MCNC: 20 MG/DL (ref 5–40)
WBC # BLD AUTO: 5.7 X10E3/UL (ref 3.4–10.8)

## 2021-08-30 ENCOUNTER — APPOINTMENT (OUTPATIENT)
Dept: LAB | Facility: HOSPITAL | Age: 46
End: 2021-08-30
Attending: SURGERY
Payer: COMMERCIAL

## 2021-08-30 ENCOUNTER — HOSPITAL ENCOUNTER (OUTPATIENT)
Dept: CARDIOLOGY | Facility: HOSPITAL | Age: 46
Discharge: HOME | End: 2021-08-30
Attending: SURGERY
Payer: COMMERCIAL

## 2021-08-30 DIAGNOSIS — K42.9 UMBILICAL HERNIA WITHOUT OBSTRUCTION AND WITHOUT GANGRENE: ICD-10-CM

## 2021-08-30 LAB — SARS-COV-2 RNA RESP QL NAA+PROBE: NEGATIVE

## 2021-08-30 PROCEDURE — U0002 COVID-19 LAB TEST NON-CDC: HCPCS

## 2021-08-30 PROCEDURE — 93005 ELECTROCARDIOGRAM TRACING: CPT

## 2021-08-31 ENCOUNTER — APPOINTMENT (OUTPATIENT)
Dept: PREADMISSION TESTING | Facility: HOSPITAL | Age: 46
End: 2021-08-31
Payer: COMMERCIAL

## 2021-08-31 VITALS — BODY MASS INDEX: 34.91 KG/M2 | HEIGHT: 74 IN | WEIGHT: 272 LBS

## 2021-08-31 LAB
ATRIAL RATE: 67
P AXIS: 71
PR INTERVAL: 156
QRS DURATION: 82
QT INTERVAL: 410
QTC CALCULATION(BAZETT): 433
R AXIS: 59
T WAVE AXIS: 37
VENTRICULAR RATE: 67

## 2021-08-31 ASSESSMENT — PAIN SCALES - GENERAL: PAINLEVEL: 1

## 2021-08-31 NOTE — PRE-PROCEDURE INSTRUCTIONS
1. Admissions will call you with your arrival time on 9/2/21 (day prior to surgery) between 2pm - 4pm. For questions about your arrival time, please call [phone].    2. Please report to the [facility] in the [place] on the day of your procedure. Enter the hospital through the [facility] (main entrance at [address], [place]). If you are parking in the [facility], come to the ground floor of the garage and follow signs to the [facility]. Bring your insurance card and photo ID.     3. Please follow these fasting guidelines:  - STOP all solid food 8 hours prior to arrival.   - No more than 12oz of water is permitted and must STOP 2 HOURS prior to arrival to the hospital.     4. Early on the morning of the procedure, please take the following medications listed below with a sip of water, in addition to any medications prescribed by your surgeon: None     *NO aspirin, ibuprofen, anti-inflammatories, fish oil or Vitamin E unless ordered by physician.    *Stop taking      5. Other instructions: antibacterial shower x 2, brush teeth    6. If you develop a cough, cold, fever, or other symptom prior to the date of the procedure, please report it to your physician immediately.    7. If you need to cancel the procedure for any reason, please contact your physician.    8. Make arrangements to have someone drive you home from the procedure. If you have not arranged for transportation home, your surgery may be cancelled.     9. You may not take public transportation or a cab unless accompanied by a responsible person.    10. You may not drive a car or operate complex or potentially dangerous machinery for 24 hours following anesthesia and/or sedation.    11. If it is medically necessary for you to have a longer stay, you will be informed as soon as the decision is made.    12. Do not wear or bring anything of value to the hospital, including medication or jewelry of any kind. Do  not wear make-up or contact lenses. Do bring your glasses and hearing aid, with a case. If you use a CPAP machine and will be overnight, please bring it with you. If you use any inhalers, please bring them as well.     13. Dress in comfortable clothes.    14. If instructed, please bring a copy of your Advance Directive (Living Will/Durable Power of ) on the day of your procedure.    15. Ensuring your safety at all times is a very important part of our [facility] Culture of Safety. After having surgery and sedation, you are at risk for falling and balance issues. Although you may feel awake, the effects of the medication can last up to 24 hours after anesthesia. If you need to use the bathroom during your recovery period, nursing staff will escort you there and stay with you to ensure your safety.    Pre operative instructions given as per protocol.  Form explained by: Lluvia Inman RN     Covid visitor policy reviewed

## 2021-09-02 ENCOUNTER — ANESTHESIA EVENT (OUTPATIENT)
Dept: OPERATING ROOM | Facility: HOSPITAL | Age: 46
Setting detail: HOSPITAL OUTPATIENT SURGERY
End: 2021-09-02
Payer: COMMERCIAL

## 2021-09-03 ENCOUNTER — HOSPITAL ENCOUNTER (OUTPATIENT)
Facility: HOSPITAL | Age: 46
Setting detail: HOSPITAL OUTPATIENT SURGERY
Discharge: HOME | End: 2021-09-03
Attending: SURGERY | Admitting: SURGERY
Payer: COMMERCIAL

## 2021-09-03 ENCOUNTER — ANESTHESIA (OUTPATIENT)
Dept: OPERATING ROOM | Facility: HOSPITAL | Age: 46
Setting detail: HOSPITAL OUTPATIENT SURGERY
End: 2021-09-03
Payer: COMMERCIAL

## 2021-09-03 VITALS
HEART RATE: 72 BPM | DIASTOLIC BLOOD PRESSURE: 72 MMHG | SYSTOLIC BLOOD PRESSURE: 126 MMHG | HEIGHT: 74 IN | RESPIRATION RATE: 18 BRPM | BODY MASS INDEX: 34.91 KG/M2 | WEIGHT: 272 LBS | TEMPERATURE: 98 F | OXYGEN SATURATION: 98 %

## 2021-09-03 PROCEDURE — 71000011 HC PACU PHASE 1 EA ADDL MIN: Performed by: SURGERY

## 2021-09-03 PROCEDURE — C1781 MESH (IMPLANTABLE): HCPCS | Performed by: SURGERY

## 2021-09-03 PROCEDURE — 36000003 HC OR LEVEL 3 INITIAL 30MIN: Performed by: SURGERY

## 2021-09-03 PROCEDURE — 63600000 HC DRUGS/DETAIL CODE: Performed by: SURGERY

## 2021-09-03 PROCEDURE — 0WUF0JZ SUPPLEMENT ABDOMINAL WALL WITH SYNTHETIC SUBSTITUTE, OPEN APPROACH: ICD-10-PCS | Performed by: SURGERY

## 2021-09-03 PROCEDURE — 25000000 HC PHARMACY GENERAL: Performed by: STUDENT IN AN ORGANIZED HEALTH CARE EDUCATION/TRAINING PROGRAM

## 2021-09-03 PROCEDURE — 63600000 HC DRUGS/DETAIL CODE: Performed by: STUDENT IN AN ORGANIZED HEALTH CARE EDUCATION/TRAINING PROGRAM

## 2021-09-03 PROCEDURE — 63700000 HC SELF-ADMINISTRABLE DRUG: Performed by: STUDENT IN AN ORGANIZED HEALTH CARE EDUCATION/TRAINING PROGRAM

## 2021-09-03 PROCEDURE — 25000000 HC PHARMACY GENERAL: Performed by: SURGERY

## 2021-09-03 PROCEDURE — 25800000 HC PHARMACY IV SOLUTIONS: Performed by: SURGERY

## 2021-09-03 PROCEDURE — 71000002 HC PACU PHASE 2 INITIAL 30MIN: Performed by: SURGERY

## 2021-09-03 PROCEDURE — 25800000 HC PHARMACY IV SOLUTIONS: Performed by: STUDENT IN AN ORGANIZED HEALTH CARE EDUCATION/TRAINING PROGRAM

## 2021-09-03 PROCEDURE — 49585 PR REPAIR UMBILICAL HERN,5+Y/O,REDUC: CPT | Performed by: SURGERY

## 2021-09-03 PROCEDURE — 63600000 HC DRUGS/DETAIL CODE: Performed by: ANESTHESIOLOGY

## 2021-09-03 PROCEDURE — 27200000 HC STERILE SUPPLY: Performed by: SURGERY

## 2021-09-03 PROCEDURE — 71000001 HC PACU PHASE 1 INITIAL 30MIN: Performed by: SURGERY

## 2021-09-03 PROCEDURE — 36000013 HC OR LEVEL 3 EA ADDL MIN: Performed by: SURGERY

## 2021-09-03 PROCEDURE — 71000012 HC PACU PHASE 2 EA ADDL MIN: Performed by: SURGERY

## 2021-09-03 PROCEDURE — 37000001 HC ANESTHESIA GENERAL: Performed by: SURGERY

## 2021-09-03 DEVICE — PATCH VENTRAL PROCEED 6.4CM: Type: IMPLANTABLE DEVICE | Site: ABDOMEN | Status: FUNCTIONAL

## 2021-09-03 RX ORDER — IBUPROFEN 200 MG
16-32 TABLET ORAL AS NEEDED
Status: DISCONTINUED | OUTPATIENT
Start: 2021-09-03 | End: 2021-09-03 | Stop reason: HOSPADM

## 2021-09-03 RX ORDER — FENTANYL CITRATE 50 UG/ML
50 INJECTION, SOLUTION INTRAMUSCULAR; INTRAVENOUS
Status: DISCONTINUED | OUTPATIENT
Start: 2021-09-03 | End: 2021-09-03 | Stop reason: HOSPADM

## 2021-09-03 RX ORDER — KETOROLAC TROMETHAMINE 30 MG/ML
INJECTION, SOLUTION INTRAMUSCULAR; INTRAVENOUS
Status: DISCONTINUED
Start: 2021-09-03 | End: 2021-09-03 | Stop reason: HOSPADM

## 2021-09-03 RX ORDER — OXYCODONE HYDROCHLORIDE 5 MG/1
TABLET ORAL
Status: DISCONTINUED
Start: 2021-09-03 | End: 2021-09-03 | Stop reason: HOSPADM

## 2021-09-03 RX ORDER — PROPOFOL 10 MG/ML
INJECTION, EMULSION INTRAVENOUS AS NEEDED
Status: DISCONTINUED | OUTPATIENT
Start: 2021-09-03 | End: 2021-09-03 | Stop reason: SURG

## 2021-09-03 RX ORDER — SODIUM CHLORIDE 9 MG/ML
INJECTION, SOLUTION INTRAVENOUS CONTINUOUS
Status: DISCONTINUED | OUTPATIENT
Start: 2021-09-03 | End: 2021-09-03 | Stop reason: HOSPADM

## 2021-09-03 RX ORDER — DEXAMETHASONE SODIUM PHOSPHATE 4 MG/ML
INJECTION, SOLUTION INTRA-ARTICULAR; INTRALESIONAL; INTRAMUSCULAR; INTRAVENOUS; SOFT TISSUE AS NEEDED
Status: DISCONTINUED | OUTPATIENT
Start: 2021-09-03 | End: 2021-09-03 | Stop reason: SURG

## 2021-09-03 RX ORDER — OXYCODONE HYDROCHLORIDE 5 MG/1
10 TABLET ORAL EVERY 4 HOURS PRN
Status: DISCONTINUED | OUTPATIENT
Start: 2021-09-03 | End: 2021-09-03 | Stop reason: HOSPADM

## 2021-09-03 RX ORDER — DEXTROSE 50 % IN WATER (D50W) INTRAVENOUS SYRINGE
25 AS NEEDED
Status: DISCONTINUED | OUTPATIENT
Start: 2021-09-03 | End: 2021-09-03 | Stop reason: HOSPADM

## 2021-09-03 RX ORDER — OXYCODONE HYDROCHLORIDE 5 MG/1
5 TABLET ORAL EVERY 4 HOURS PRN
Qty: 8 TABLET | Refills: 0 | Status: SHIPPED | OUTPATIENT
Start: 2021-09-03 | End: 2021-09-08

## 2021-09-03 RX ORDER — FENTANYL CITRATE 50 UG/ML
50 INJECTION, SOLUTION INTRAMUSCULAR; INTRAVENOUS
Status: DISCONTINUED | OUTPATIENT
Start: 2021-09-03 | End: 2021-09-03

## 2021-09-03 RX ORDER — OXYCODONE HYDROCHLORIDE 5 MG/1
5 TABLET ORAL EVERY 4 HOURS PRN
Status: DISCONTINUED | OUTPATIENT
Start: 2021-09-03 | End: 2021-09-03 | Stop reason: HOSPADM

## 2021-09-03 RX ORDER — HYDROMORPHONE HYDROCHLORIDE 1 MG/ML
0.5 INJECTION, SOLUTION INTRAMUSCULAR; INTRAVENOUS; SUBCUTANEOUS
Status: DISCONTINUED | OUTPATIENT
Start: 2021-09-03 | End: 2021-09-03 | Stop reason: HOSPADM

## 2021-09-03 RX ORDER — NEOSTIGMINE METHYLSULFATE 1 MG/ML
INJECTION INTRAVENOUS AS NEEDED
Status: DISCONTINUED | OUTPATIENT
Start: 2021-09-03 | End: 2021-09-03 | Stop reason: SURG

## 2021-09-03 RX ORDER — ONDANSETRON HYDROCHLORIDE 2 MG/ML
INJECTION, SOLUTION INTRAVENOUS AS NEEDED
Status: DISCONTINUED | OUTPATIENT
Start: 2021-09-03 | End: 2021-09-03 | Stop reason: SURG

## 2021-09-03 RX ORDER — ONDANSETRON HYDROCHLORIDE 2 MG/ML
4 INJECTION, SOLUTION INTRAVENOUS
Status: DISCONTINUED | OUTPATIENT
Start: 2021-09-03 | End: 2021-09-03 | Stop reason: HOSPADM

## 2021-09-03 RX ORDER — FENTANYL CITRATE 50 UG/ML
INJECTION, SOLUTION INTRAMUSCULAR; INTRAVENOUS AS NEEDED
Status: DISCONTINUED | OUTPATIENT
Start: 2021-09-03 | End: 2021-09-03 | Stop reason: SURG

## 2021-09-03 RX ORDER — DEXTROSE 40 %
15-30 GEL (GRAM) ORAL AS NEEDED
Status: DISCONTINUED | OUTPATIENT
Start: 2021-09-03 | End: 2021-09-03 | Stop reason: HOSPADM

## 2021-09-03 RX ORDER — BUPIVACAINE HYDROCHLORIDE AND EPINEPHRINE 2.5; 5 MG/ML; UG/ML
INJECTION, SOLUTION EPIDURAL; INFILTRATION; INTRACAUDAL; PERINEURAL AS NEEDED
Status: DISCONTINUED | OUTPATIENT
Start: 2021-09-03 | End: 2021-09-03 | Stop reason: HOSPADM

## 2021-09-03 RX ORDER — MIDAZOLAM HYDROCHLORIDE 2 MG/2ML
INJECTION, SOLUTION INTRAMUSCULAR; INTRAVENOUS AS NEEDED
Status: DISCONTINUED | OUTPATIENT
Start: 2021-09-03 | End: 2021-09-03 | Stop reason: SURG

## 2021-09-03 RX ORDER — SODIUM CHLORIDE, SODIUM GLUCONATE, SODIUM ACETATE, POTASSIUM CHLORIDE AND MAGNESIUM CHLORIDE 30; 37; 368; 526; 502 MG/100ML; MG/100ML; MG/100ML; MG/100ML; MG/100ML
INJECTION, SOLUTION INTRAVENOUS CONTINUOUS PRN
Status: DISCONTINUED | OUTPATIENT
Start: 2021-09-03 | End: 2021-09-03 | Stop reason: SURG

## 2021-09-03 RX ORDER — GLYCOPYRROLATE 0.6MG/3ML
SYRINGE (ML) INTRAVENOUS AS NEEDED
Status: DISCONTINUED | OUTPATIENT
Start: 2021-09-03 | End: 2021-09-03 | Stop reason: SURG

## 2021-09-03 RX ORDER — ONDANSETRON HYDROCHLORIDE 2 MG/ML
4 INJECTION, SOLUTION INTRAVENOUS
Status: DISCONTINUED | OUTPATIENT
Start: 2021-09-03 | End: 2021-09-03

## 2021-09-03 RX ORDER — KETOROLAC TROMETHAMINE 30 MG/ML
30 INJECTION, SOLUTION INTRAMUSCULAR; INTRAVENOUS EVERY 6 HOURS PRN
Status: DISCONTINUED | OUTPATIENT
Start: 2021-09-03 | End: 2021-09-03 | Stop reason: HOSPADM

## 2021-09-03 RX ORDER — ACETAMINOPHEN 325 MG/1
650 TABLET ORAL EVERY 4 HOURS PRN
Status: DISCONTINUED | OUTPATIENT
Start: 2021-09-03 | End: 2021-09-03 | Stop reason: HOSPADM

## 2021-09-03 RX ORDER — LIDOCAINE HYDROCHLORIDE 10 MG/ML
INJECTION, SOLUTION INFILTRATION; PERINEURAL AS NEEDED
Status: DISCONTINUED | OUTPATIENT
Start: 2021-09-03 | End: 2021-09-03 | Stop reason: SURG

## 2021-09-03 RX ORDER — DIPHENHYDRAMINE HCL 50 MG/ML
12.5 VIAL (ML) INJECTION
Status: DISCONTINUED | OUTPATIENT
Start: 2021-09-03 | End: 2021-09-03 | Stop reason: HOSPADM

## 2021-09-03 RX ORDER — ROCURONIUM BROMIDE 10 MG/ML
INJECTION, SOLUTION INTRAVENOUS AS NEEDED
Status: DISCONTINUED | OUTPATIENT
Start: 2021-09-03 | End: 2021-09-03 | Stop reason: SURG

## 2021-09-03 RX ORDER — MEPERIDINE HYDROCHLORIDE 25 MG/ML
12.5 INJECTION INTRAMUSCULAR; INTRAVENOUS; SUBCUTANEOUS EVERY 10 MIN PRN
Status: DISCONTINUED | OUTPATIENT
Start: 2021-09-03 | End: 2021-09-03 | Stop reason: HOSPADM

## 2021-09-03 RX ORDER — HYDROMORPHONE HYDROCHLORIDE 1 MG/ML
0.5 INJECTION, SOLUTION INTRAMUSCULAR; INTRAVENOUS; SUBCUTANEOUS
Status: DISCONTINUED | OUTPATIENT
Start: 2021-09-03 | End: 2021-09-03

## 2021-09-03 RX ADMIN — PROPOFOL 200 MG: 10 INJECTION, EMULSION INTRAVENOUS at 12:54

## 2021-09-03 RX ADMIN — MIDAZOLAM HYDROCHLORIDE 2 MG: 1 INJECTION, SOLUTION INTRAMUSCULAR; INTRAVENOUS at 12:43

## 2021-09-03 RX ADMIN — DEXAMETHASONE SODIUM PHOSPHATE 4 MG: 4 INJECTION, SOLUTION INTRA-ARTICULAR; INTRALESIONAL; INTRAMUSCULAR; INTRAVENOUS; SOFT TISSUE at 13:02

## 2021-09-03 RX ADMIN — ROCURONIUM BROMIDE 50 MG: 10 INJECTION, SOLUTION INTRAVENOUS at 12:55

## 2021-09-03 RX ADMIN — SODIUM CHLORIDE: 0.9 INJECTION, SOLUTION INTRAVENOUS at 12:46

## 2021-09-03 RX ADMIN — Medication 1 MG: at 13:46

## 2021-09-03 RX ADMIN — FENTANYL CITRATE 50 MCG: 50 INJECTION, SOLUTION INTRAMUSCULAR; INTRAVENOUS at 14:49

## 2021-09-03 RX ADMIN — FENTANYL CITRATE 100 MCG: 50 INJECTION, SOLUTION INTRAMUSCULAR; INTRAVENOUS at 12:54

## 2021-09-03 RX ADMIN — OXYCODONE HYDROCHLORIDE 5 MG: 5 TABLET ORAL at 15:26

## 2021-09-03 RX ADMIN — FENTANYL CITRATE 50 MCG: 50 INJECTION, SOLUTION INTRAMUSCULAR; INTRAVENOUS at 13:30

## 2021-09-03 RX ADMIN — FENTANYL CITRATE 50 MCG: 50 INJECTION, SOLUTION INTRAMUSCULAR; INTRAVENOUS at 14:34

## 2021-09-03 RX ADMIN — ONDANSETRON 4 MG: 2 INJECTION INTRAMUSCULAR; INTRAVENOUS at 13:02

## 2021-09-03 RX ADMIN — SODIUM CHLORIDE 3 MG: 9 INJECTION, SOLUTION INTRAVENOUS at 12:58

## 2021-09-03 RX ADMIN — Medication 5 MG: at 13:46

## 2021-09-03 RX ADMIN — LIDOCAINE HYDROCHLORIDE 5 ML: 10 INJECTION, SOLUTION INFILTRATION; PERINEURAL at 12:54

## 2021-09-03 RX ADMIN — KETOROLAC TROMETHAMINE 30 MG: 30 INJECTION, SOLUTION INTRAMUSCULAR; INTRAVENOUS at 16:02

## 2021-09-03 RX ADMIN — SODIUM CHLORIDE, SODIUM GLUCONATE, SODIUM ACETATE, POTASSIUM CHLORIDE AND MAGNESIUM CHLORIDE: 526; 502; 368; 37; 30 INJECTION, SOLUTION INTRAVENOUS at 13:59

## 2021-09-03 ASSESSMENT — PAIN SCALES - GENERAL: PAIN_LEVEL: 1

## 2021-09-03 NOTE — OP NOTE
Pre-Procedure Diagnosis: Umbilical hernia    Post-Procedure Diagnosis: Same    Procedure: Open repair of umbilical hernia with mesh    Surgeon: Natalie Hernandez MD    Assistant: Olesya Lobato, PGY 4    Anesthesia: General endotracheal anesthesia    EBL: 5 cc    Drains: None    Specimen: None    Findings: Small umbilical hernia    Indications: This is a 45-year-old male who presented for evaluation of an umbilical hernia which he developed recently. He states that he lifts very heavy weights on a regular basis and noticed it enlarging without activity.    Procedure: Patient was brought into the operating room and properly identified. He was positioned supine on the operating room table and general tracheal anesthesia was induced without complication. The abdomen was prepped and draped in sterile fashion and an appropriate timeout was performed. Local anesthetic was injected around the umbilicus and a #15 blade was used to make an infraumbilical curvilinear incision. Subcutaneous tissues were dissected with electrocautery down to the level of the hernia defect which was readily identifiable. This was a small defect just under 1 cm. It contained fat which was easily reducible.    Given the patient's extremely active lifestyle, despite the small size of the hernia we elected to repair it with mesh reinforcement. A space was created in the peritoneal cavity free of the abdominal wall adhesions to accommodate a 6 cm round piece of proceed mesh. This was sutured in place with #1 PDS suture which was also used to close the fascial defect over top of the mesh, with 4 simple interrupted sutures. Skin was then closed in 2 layers with a layer of 2-0 Vicryl followed by 4-0 Monocryl.    Steri-Strips were placed followed by dry sterile dressings. The patient tolerated the procedure well. There were no immediate complications. Postoperatively he was extubated and taken to the PACU for continued recovery. All sponge and  instrument counts were correct at the completion of the case.

## 2021-09-03 NOTE — ANESTHESIA POSTPROCEDURE EVALUATION
Patient: Phill Brown    Procedure Summary     Date: 09/03/21 Room / Location: [facility] OR  / [facility]    Anesthesia Start: 1246 Anesthesia Stop: 1407    Procedure: HERNIA UMBILICAL REPAIR with Mesh (N/A Abdomen) Diagnosis:       Umbilical hernia without obstruction and without gangrene      (Umbilical hernia without obstruction and without gangrene [K42.9])    Surgeons: Natalie Hernandez MD Responsible Provider: Airam Abad MD    Anesthesia Type: general ASA Status: 1          Anesthesia Type: general  PACU Vitals  9/3/2021 1359 - 9/3/2021 1459      9/3/2021  1403 9/3/2021  1415 9/3/2021  1430 9/3/2021  1445    BP:  126/79  128/79  136/80  134/88    Temp:  36.1 °C (97 °F)  --  --  --    Pulse:  83  75  68  65    Resp:  12  (!) 22  12  (!) 6    SpO2:  100 %  98 %  98 %  100 %            Anesthesia Post Evaluation    Pain score: 1  Pain management: adequate  Mode of pain management: IV medication  Patient location during evaluation: PACU  Patient participation: complete - patient participated  Level of consciousness: awake and alert  Cardiovascular status: acceptable  Airway Patency: adequate  Respiratory status: acceptable  Hydration status: acceptable  Anesthetic complications: no

## 2021-09-03 NOTE — ANESTHESIOLOGIST PRE-PROCEDURE ATTESTATION
Pre-Procedure Patient Identification:  I am the Primary Anesthesiologist and have identified the patient on 09/03/21 at 11:47 AM.   I have confirmed the procedure(s) will be performed by the following surgeon/proceduralist Natalie Hernandez MD.

## 2021-09-03 NOTE — ANESTHESIA PROCEDURE NOTES
Airway  Urgency: elective    Start Time: 9/3/2021 12:58 PM  Airway not difficult    General Information and Staff    Patient location during procedure: OR  Anesthesiologist: Airam Abad MD  Resident/CRNA: Agustina Comer CRNA  Performed: resident/CRNA     Indications and Patient Condition  Indications for airway management: anesthesia  Sedation level: general  Preoxygenated: yes  Patient position: sniffing  Mask difficulty assessment: 1 - vent by mask    Final Airway Details  Final airway type: endotracheal airway      Successful airway: ETT    Successful intubation technique: direct laryngoscopy  Facilitating devices/methods: intubating stylet  Endotracheal tube insertion site: oral  Blade: Dimple  Blade size: #4  ETT size (mm): 8.0  Cormack-Lehane Classification: grade I - full view of glottis  Placement verified by: chest auscultation and capnometry   Measured from: lips  ETT to lips (cm): 23  Number of attempts at approach: 1  Atraumatic airway insertion

## 2021-09-03 NOTE — OR SURGEON
Pre-Procedure patient identification:  I am the primary operating surgeon/proceduralist and I have identified the patient on 09/03/21 at 12:01 PM Natalie Hernandez MD  Phone Number: [phone]

## 2021-09-03 NOTE — DISCHARGE INSTRUCTIONS
DISCHARGE INSTRUCTIONS FOR    HERNIA REPAIR     Description of Procedure:  You have had a hernia (small hole in muscle of abdominal wall) repaired.  We inserted a piece of mesh between the layers of your abdominal wall completely obliterating the hole.    WOUND CARE:  • Your small incision is closed with absorbable stitches that do not need to be removed.    • As your body absorbs these stitches, you may notice mild redness or discomfort around the incision or you may notice a small ridge along the incision.  This process is finished in about 3 weeks.  • Once the dressings are removed, you will see small tapes covering the incision.  Leave these tapes on until they come off by themselves.  If they remain in place after two weeks, you may remove them at that time.    DRESSINGS:  • You should remove your dressings the day after surgery.  It is not necessary to replace the dressings.  • If your clothing rubs on the incision or you have a small amount of drainage from the incision, you may keep it covered.    WASHING:  • Once the dressing has been removed, you may get the incisions wet in a shower, bath or pool.   • Gently clean the incisions with regular soap and water daily and pat dry.    ACTIVITY:  • You have no limitations on physical activity at home.    • Most patients find that 1-2 weeks of rest from physical activity speeds their healing.    • As you heal, you will notice increased fatigue during the next 2-3 weeks.  This is a normal part of the healing process.  When you feel tired, you should rest.    DRIVING:  • You may drive once you are pain free.  You cannot hurt your incisions driving a car however if you are having discomfort or taking pain medicine, your reflexes will be slower and your chances of an accident much higher.    PAIN:  • You will have a moderate amount of discomfort for 2-3 days.  After that the discomfort will lessen rapidly.    MEDICATION:  • After surgery you should immediately  resume any regular medications that you take.  • For pain:  o Tylenol, 2 tablets every 4 hours as needed for mild pain.  o Percocet, 1-2 tablets every 3 hours as needed for more moderate to severe pain.  o Do NOT take Tylenol and Percocet at the same time.    DIET:  • You may resume your regular diet immediately after surgery.  • Some patients notice an upset stomach for 24 hours after surgery.  If your stomach is upset, eat lightly and avoid heavily spiced or fatty foods for 24 hours.    REGULARITY:  • Pain from surgery, inactivity, and pain medication may upset your usual bowel habits.  They will return to normal as you heal.  • If needed use Milk of Magnesia 1 oz. twice daily to help you resume regular habits.    MISCELLANEOUS:  • You have a catheter inserted into your bladder after you are asleep and removed before you wake up.  You may notice slight burning with voiding the first time or two.  This will rapidly disappear.  • You may notice some bruising of the groin or pelvic area.  This is due to  muscles to insert the mesh.  It is normal for this operation and not a cause for alarm.  It will fade over the next several weeks.  • You may notice a small lump in the area of your hernia.  This is not your hernia.  It is a small collection of blood or fluid that will disappear over the next 1-2 months    WHEN TO CALL US: [phone]  • You should call prior to your regular follow-up appointment if you notice:  ? Significant redness around the incision.  ? Temperature of 101 degrees or above.  (Temperatures to 100.4 are normal after surgery)  ? Significant increase in the amount of pain.      **Someone is always available to answer your questions, so please call if you are concerned.

## 2021-09-03 NOTE — ANESTHESIA PREPROCEDURE EVALUATION
Relevant Problems   RESPIRATORY SYSTEM   (+) Asthma      Other   (+) Dengue fever       Anesthesia ROS/MED HX    Anesthesia History - neg  Pulmonary    asthma  Neuro/Psych - neg  Cardiovascular   Covid19 Test Reviewed  GI/Hepatic- neg  Musculoskeletal- neg  Renal Disease- neg  Endo/Other- neg  Body Habitus: Normal       Past Surgical History:   Procedure Laterality Date   • APPENDECTOMY     • EPIDURAL BLOCK INJECTION      times 2 on Back    • WISDOM TOOTH EXTRACTION         Physical Exam    Airway   Mallampati: I   TM distance: >3 FB   Neck ROM: full  Cardiovascular - normal   Rhythm: regular   Rate: normalPulmonary - normal   clear to auscultation  Dental - normal        Anesthesia Plan    Plan: general    Technique: general endotracheal     Lines and Monitors: PIV     Airway: oral intubation   ASA 1  Blood Products:   Use of Blood Products Discussed: No   Anesthetic plan and risks discussed with: patient  Induction:    intravenous   Postop Plan:   Patient Disposition: phase II then home   Pain Management: IV analgesics

## 2021-10-06 ENCOUNTER — TELEMEDICINE (OUTPATIENT)
Dept: FAMILY MEDICINE | Facility: CLINIC | Age: 46
End: 2021-10-06
Payer: COMMERCIAL

## 2021-10-06 ENCOUNTER — HOSPITAL ENCOUNTER (OUTPATIENT)
Dept: RADIOLOGY | Facility: CLINIC | Age: 46
Discharge: HOME | End: 2021-10-06
Attending: FAMILY MEDICINE
Payer: COMMERCIAL

## 2021-10-06 DIAGNOSIS — J45.20 MILD INTERMITTENT ASTHMA WITHOUT COMPLICATION: Primary | ICD-10-CM

## 2021-10-06 DIAGNOSIS — J45.20 MILD INTERMITTENT ASTHMA WITHOUT COMPLICATION: ICD-10-CM

## 2021-10-06 PROCEDURE — 71046 X-RAY EXAM CHEST 2 VIEWS: CPT

## 2021-10-06 PROCEDURE — 99213 OFFICE O/P EST LOW 20 MIN: CPT | Mod: 95 | Performed by: FAMILY MEDICINE

## 2021-10-06 RX ORDER — PREDNISONE 50 MG/1
50 TABLET ORAL DAILY
Qty: 5 TABLET | Refills: 0 | Status: SHIPPED | OUTPATIENT
Start: 2021-10-06 | End: 2022-05-02

## 2021-10-06 RX ORDER — PREDNISONE 10 MG/1
TABLET ORAL
COMMUNITY
Start: 2021-09-07 | End: 2021-10-06

## 2021-10-06 RX ORDER — FLUTICASONE PROPIONATE 44 UG/1
AEROSOL, METERED RESPIRATORY (INHALATION)
COMMUNITY
Start: 2021-09-29 | End: 2022-05-09 | Stop reason: SDUPTHER

## 2021-10-06 ASSESSMENT — ENCOUNTER SYMPTOMS
FEVER: 0
COUGH: 1
SHORTNESS OF BREATH: 0
CHEST TIGHTNESS: 1

## 2021-10-06 NOTE — PROGRESS NOTES
Verification of Patient Location:  The patient affirms they are currently located in the following state: [place]    Request for Consent:    Audio and Video Encounter   Rikki, my name is Harry A. Frankel, MD.  Before we proceed, can you please verify your identification by telling me your full name and date of birth?  Can you tell me who is in the room with you?    You and I are about to have a telemedicine check-in or visit because you have requested it.  This is a live video-conference.  I am a real person, speaking to you in real time.  There is no one else with me on the video-conference.  However, when we use ([employer], [employer], etc) it is important for you to know that the video-conference may not be secure or private.  I am not recording this conversation and I am asking you not to record it.  This telemedicine visit will be billed to your health insurance or you, if you are self-insured.  You understand you will be responsible for any copayments or coinsurances that apply to your telemedicine visit.  Communication platform used for this encounter:  [employer]     Before starting our telemedicine visit, I am required to get your consent for this virtual check-in or visit by telemedicine. Do you consent?      Patient Response to Request for Consent:  Yes      Visit Documentation:  Subjective     Patient ID: Phill Brown is a 46 y.o. male.  1975      45 yo male with asthma. He has been sick. Went to . Covid negative. Had a course of prednisone and was better. Went camping and it reoccurred. No fever. He is using his inhalers.      The following have been reviewed and updated as appropriate in this visit:  Tobacco  Allergies  Meds  Problems  Med Hx  Surg Hx  Fam Hx  Soc Hx        Review of Systems   Constitutional: Negative for fever.   Respiratory: Positive for cough and chest tightness. Negative for shortness of breath.          Assessment/Plan   Diagnoses and all orders for this visit:    Mild  intermittent asthma without complication (Primary)  Assessment & Plan:  Continue inhalers  Prednisone 50 mg daily for 5 days  CXR since he is leaving for prolonged stay in [place]    Orders:  -     X-RAY CHEST 2 VIEWS; Future    Other orders  -     predniSONE (DELTASONE) 50 mg tablet; Take 1 tablet (50 mg total) by mouth daily for 5 days.      Time Spent:  I spent 12 minutes on this date of service performing the following activities: obtaining history, entering orders and documenting.

## 2021-10-06 NOTE — ASSESSMENT & PLAN NOTE
Continue inhalers  Prednisone 50 mg daily for 5 days  CXR since he is leaving for prolonged stay in [place]

## 2022-03-28 RX ORDER — METHYLPHENIDATE HYDROCHLORIDE 36 MG/1
36 TABLET ORAL DAILY
Qty: 90 TABLET | Refills: 0 | Status: SHIPPED | OUTPATIENT
Start: 2022-03-28 | End: 2022-05-09 | Stop reason: SDUPTHER

## 2022-05-02 ENCOUNTER — OFFICE VISIT (OUTPATIENT)
Dept: FAMILY MEDICINE | Facility: CLINIC | Age: 47
End: 2022-05-02
Payer: COMMERCIAL

## 2022-05-02 VITALS
WEIGHT: 279 LBS | OXYGEN SATURATION: 97 % | DIASTOLIC BLOOD PRESSURE: 86 MMHG | HEART RATE: 67 BPM | HEIGHT: 74 IN | SYSTOLIC BLOOD PRESSURE: 124 MMHG | TEMPERATURE: 97.4 F | BODY MASS INDEX: 35.81 KG/M2

## 2022-05-02 DIAGNOSIS — F90.2 ATTENTION DEFICIT HYPERACTIVITY DISORDER (ADHD), COMBINED TYPE: ICD-10-CM

## 2022-05-02 DIAGNOSIS — Z00.00 PREVENTATIVE HEALTH CARE: Primary | ICD-10-CM

## 2022-05-02 DIAGNOSIS — J45.20 MILD INTERMITTENT ASTHMA WITHOUT COMPLICATION: ICD-10-CM

## 2022-05-02 PROCEDURE — 90471 IMMUNIZATION ADMIN: CPT | Performed by: FAMILY MEDICINE

## 2022-05-02 PROCEDURE — 3008F BODY MASS INDEX DOCD: CPT | Performed by: FAMILY MEDICINE

## 2022-05-02 PROCEDURE — 36415 COLL VENOUS BLD VENIPUNCTURE: CPT | Performed by: FAMILY MEDICINE

## 2022-05-02 PROCEDURE — 90732 PPSV23 VACC 2 YRS+ SUBQ/IM: CPT | Performed by: FAMILY MEDICINE

## 2022-05-02 PROCEDURE — 99396 PREV VISIT EST AGE 40-64: CPT | Mod: 25 | Performed by: FAMILY MEDICINE

## 2022-05-02 ASSESSMENT — ENCOUNTER SYMPTOMS
FATIGUE: 0
POLYPHAGIA: 0
BLOOD IN STOOL: 0
ARTHRALGIAS: 0
CONSTIPATION: 0
POLYDIPSIA: 0
BACK PAIN: 0
NERVOUS/ANXIOUS: 0
HEADACHES: 0
BRUISES/BLEEDS EASILY: 0
DIARRHEA: 0
SHORTNESS OF BREATH: 0

## 2022-05-02 NOTE — PROGRESS NOTES
[place] [address]  [phone]       Reason for visit:   Chief Complaint   Patient presents with   • Illness      HPI   Phill Brown is a 46 y.o. male who presents with CPE. He has asthma and ADHD. He is compliant with medications. He is exercising. He is compliant with medications. His chronic medical conditions are stable.      Past Medical History:   Diagnosis Date   • ADHD    • Asthma    • COVID-19 vaccine series completed      Past Surgical History:   Procedure Laterality Date   • APPENDECTOMY     • EPIDURAL BLOCK INJECTION      times 2 on Back    • HERNIA REPAIR     • WISDOM TOOTH EXTRACTION       Social History     Tobacco Use   • Smoking status: Never Smoker   • Smokeless tobacco: Never Used   Vaping Use   • Vaping Use: Never used   Substance Use Topics   • Alcohol use: Yes     Comment: very rare   • Drug use: No      Family History   Family history unknown: Yes     Penicillins  Current Outpatient Medications   Medication Sig Dispense Refill   • albuterol HFA (VENTOLIN HFA) 90 mcg/actuation inhaler Inhale 2 puffs every 6 (six) hours as needed for wheezing. 18 g 1   • melatonin 3 mg tablet Take 3 mg by mouth nightly.     • methylphenidate HCl 36 mg ER tablet Take 1 tablet (36 mg total) by mouth daily. 90 tablet 0   • multivit with minerals/lutein (MULTIVITAMIN 50 PLUS ORAL) No SIG Entered     • FLOVENT HFA 44 mcg/actuation inhaler INHALE 2 PUFF (INHALATION) 2 TIMES PER DAY FOR 30 DAYS     • NON FORMULARY MEDICATION REQUEST quadriden ointment antibiotic for bug bites       No current facility-administered medications for this visit.       Patient Active Problem List    Diagnosis Date Noted   • Umbilical hernia 08/17/2021   • Dengue fever 05/06/2019   • Preventative health care 11/20/2018   • Family history of coronary artery disease 11/19/2018   • ADHD (attention deficit hyperactivity disorder) 10/24/2017   • Asthma 04/26/2016  "        Review of Systems   Constitutional: Negative for fatigue.   HENT: Negative for hearing loss.    Eyes: Negative for visual disturbance.   Respiratory: Negative for shortness of breath.    Cardiovascular: Negative for chest pain.   Gastrointestinal: Negative for blood in stool, constipation and diarrhea.   Endocrine: Negative for polydipsia, polyphagia and polyuria.   Musculoskeletal: Negative for arthralgias and back pain.   Neurological: Negative for headaches.   Hematological: Does not bruise/bleed easily.   Psychiatric/Behavioral: The patient is not nervous/anxious.      Objective   Vitals:    05/02/22 0839 05/02/22 0858   BP: (!) 142/92 124/86   BP Location: Right upper arm    Patient Position: Sitting    Pulse: 67    Temp: 36.3 °C (97.4 °F)    TempSrc: Oral    SpO2: 97%    Weight: 127 kg (279 lb)    Height: 1.88 m (6' 2\")      Body mass index is 35.82 kg/m².  Physical Exam  Constitutional:       Appearance: Normal appearance.   HENT:      Right Ear: Tympanic membrane and ear canal normal.      Left Ear: Tympanic membrane and ear canal normal.   Eyes:      Extraocular Movements: Extraocular movements intact.      Conjunctiva/sclera: Conjunctivae normal.      Pupils: Pupils are equal, round, and reactive to light.   Neck:      Vascular: No carotid bruit.   Cardiovascular:      Rate and Rhythm: Normal rate and regular rhythm.      Pulses: Normal pulses.      Heart sounds: Normal heart sounds.   Pulmonary:      Effort: Pulmonary effort is normal.      Breath sounds: Normal breath sounds.   Abdominal:      General: Abdomen is flat. Bowel sounds are normal.      Palpations: Abdomen is soft. There is no hepatomegaly or splenomegaly.   Musculoskeletal:         General: Normal range of motion.      Cervical back: Normal range of motion.      Right lower leg: No edema.      Left lower leg: No edema.   Lymphadenopathy:      Cervical: No cervical adenopathy.   Skin:     General: Skin is warm and dry.   Neurological: "      General: No focal deficit present.      Mental Status: He is alert and oriented to person, place, and time.   Psychiatric:         Behavior: Behavior normal.         Thought Content: Thought content normal.         Judgment: Judgment normal.         Procedures    Lab Results   Component Value Date    WBC 5.7 08/26/2021    HGB 17.1 08/26/2021    HCT 51.2 (H) 08/26/2021     08/26/2021    CHOL 214 (H) 08/26/2021    TRIG 114 08/26/2021    HDL 59 08/26/2021    LDLCALC 135 (H) 08/26/2021    ALT 49 (H) 08/26/2021    AST 33 08/26/2021     08/26/2021    K 4.7 08/26/2021     08/26/2021    CREATININE 1.29 (H) 08/26/2021    BUN 17 08/26/2021    CO2 26 08/26/2021    GLUCOSE 97 08/26/2021           Assessment   Problem List Items Addressed This Visit        Respiratory    Asthma     Stable  Continue meds  Pneumovax              Other    ADHD (attention deficit hyperactivity disorder)     Stable  Continue meds           Preventative health care - Primary     Labs  Colonoscopy           Relevant Orders    CBC and Differential    Comprehensive metabolic panel    Lipid panel    Ambulatory referral to Gastroenterology    PSA              Harry A. Frankel, MD  5/2/2022

## 2022-05-03 LAB
ALBUMIN SERPL-MCNC: 4.6 G/DL (ref 4–5)
ALBUMIN/GLOB SERPL: 1.9 {RATIO} (ref 1.2–2.2)
ALP SERPL-CCNC: 44 IU/L (ref 44–121)
ALT SERPL-CCNC: 35 IU/L (ref 0–44)
AST SERPL-CCNC: 24 IU/L (ref 0–40)
BASOPHILS # BLD AUTO: 0 X10E3/UL (ref 0–0.2)
BASOPHILS NFR BLD AUTO: 1 %
BILIRUB SERPL-MCNC: 1.3 MG/DL (ref 0–1.2)
BUN SERPL-MCNC: 17 MG/DL (ref 6–24)
BUN/CREAT SERPL: 15 (ref 9–20)
CALCIUM SERPL-MCNC: 9.6 MG/DL (ref 8.7–10.2)
CHLORIDE SERPL-SCNC: 101 MMOL/L (ref 96–106)
CHOLEST SERPL-MCNC: 197 MG/DL (ref 100–199)
CO2 SERPL-SCNC: 21 MMOL/L (ref 20–29)
CREAT SERPL-MCNC: 1.11 MG/DL (ref 0.76–1.27)
EGFRCR SERPLBLD CKD-EPI 2021: 82 ML/MIN/1.73
EOSINOPHIL # BLD AUTO: 0.1 X10E3/UL (ref 0–0.4)
EOSINOPHIL NFR BLD AUTO: 2 %
ERYTHROCYTE [DISTWIDTH] IN BLOOD BY AUTOMATED COUNT: 13.5 % (ref 11.6–15.4)
GLOBULIN SER CALC-MCNC: 2.4 G/DL (ref 1.5–4.5)
GLUCOSE SERPL-MCNC: 103 MG/DL (ref 65–99)
HCT VFR BLD AUTO: 50.3 % (ref 37.5–51)
HDLC SERPL-MCNC: 58 MG/DL
HGB BLD-MCNC: 17 G/DL (ref 13–17.7)
IMM GRANULOCYTES # BLD AUTO: 0 X10E3/UL (ref 0–0.1)
IMM GRANULOCYTES NFR BLD AUTO: 0 %
LDLC SERPL CALC-MCNC: 119 MG/DL (ref 0–99)
LYMPHOCYTES # BLD AUTO: 2.4 X10E3/UL (ref 0.7–3.1)
LYMPHOCYTES NFR BLD AUTO: 40 %
MCH RBC QN AUTO: 31 PG (ref 26.6–33)
MCHC RBC AUTO-ENTMCNC: 33.8 G/DL (ref 31.5–35.7)
MCV RBC AUTO: 92 FL (ref 79–97)
MONOCYTES # BLD AUTO: 0.6 X10E3/UL (ref 0.1–0.9)
MONOCYTES NFR BLD AUTO: 9 %
NEUTROPHILS # BLD AUTO: 2.9 X10E3/UL (ref 1.4–7)
NEUTROPHILS NFR BLD AUTO: 48 %
PLATELET # BLD AUTO: 226 X10E3/UL (ref 150–450)
POTASSIUM SERPL-SCNC: 4.3 MMOL/L (ref 3.5–5.2)
PROT SERPL-MCNC: 7 G/DL (ref 6–8.5)
PSA SERPL-MCNC: 0.8 NG/ML (ref 0–4)
RBC # BLD AUTO: 5.48 X10E6/UL (ref 4.14–5.8)
SODIUM SERPL-SCNC: 139 MMOL/L (ref 134–144)
TRIGL SERPL-MCNC: 113 MG/DL (ref 0–149)
VLDLC SERPL CALC-MCNC: 20 MG/DL (ref 5–40)
WBC # BLD AUTO: 6.1 X10E3/UL (ref 3.4–10.8)

## 2022-05-09 RX ORDER — METHYLPHENIDATE HYDROCHLORIDE 36 MG/1
36 TABLET ORAL DAILY
Qty: 90 TABLET | Refills: 0 | Status: SHIPPED | OUTPATIENT
Start: 2022-05-09 | End: 2022-05-31 | Stop reason: SDUPTHER

## 2022-07-03 NOTE — PROGRESS NOTES
"Patient ID: Phill Brown                              : 1975  MRN: [id number]                                            Visit Date: 2021  Encounter Provider: Natalie Hernandez  Referring Provider: Frankel, Harry A, MD    Subjective   Chief Complaint: Hernia    Phill Brown is a 45 y.o. old male presenting today for evaluation of an umbilical hernia.  He states that he noticed it recently after lifting weights.  He typically will do 300 pound dead lifts.  He has had some pain associated with it which she describes as sharp and shooting across his abdomen.  He has not had any episodes of nausea associated with this or other obstructive symptoms.     Medications:   Current Outpatient Medications:   •  albuterol HFA (VENTOLIN HFA) 90 mcg/actuation inhaler, Inhale 2 puffs every 6 (six) hours as needed for wheezing., Disp: 18 g, Rfl: 1  •  melatonin 3 mg tablet, Take 3 mg by mouth nightly., Disp: , Rfl:   •  methylphenidate HCl 36 mg ER tablet, Take 1 tablet (36 mg total) by mouth daily., Disp: 90 tablet, Rfl: 0  •  multivit with minerals/lutein (MULTIVITAMIN 50 PLUS ORAL), No SIG Entered, Disp: , Rfl:     Past Medical History:  has a past medical history of ADHD.  Past Surgical History:  has a past surgical history that includes Appendectomy.  Social History:  reports that he has never smoked. He has never used smokeless tobacco. He reports that he does not drink alcohol and does not use drugs.   Family History: Family history is unknown by patient.   Allergies: is allergic to penicillins.     Review of Systems   All other systems reviewed and are negative.    The following have been reviewed and updated as appropriate in this visit:         Objective   Vitals:   Visit Vitals  Ht 1.905 m (6' 3\")   Wt 123 kg (272 lb)   BMI 34.00 kg/m²     Physical Exam  Constitutional:       Appearance: He is normal weight.   HENT:      Head: Normocephalic and atraumatic.   Cardiovascular:      Rate and Rhythm: Normal " Patient : Raymond Haas Age: 21 year old Sex: male   MRN: [id number] Encounter Date: 7/2/2022      History     Chief Complaint   Patient presents with   • Medical Screening Exam     21-year-old male presents with a complaint of possible suicidal ideations.  Patient was here for similar situation about a week ago patient was in an argument with his mother today and states that is about his mother telling him how he should live his life.  This made the patient upset and he later posted on his snap chat that he was thankful for everyone who tried to help him last time, followed by pictures suggesting different objects that he can used to tie himself to in order drown himself in the lake, while standing next to the lake.  Someone then called 911 and the patient and he was brought to the emergency room.  Police at bedside.  When asked if the patient is suicidal, he states “a little.”  He denies having a specific plan despite showing me the story on his snap chat.  Denies any homicidal ideations or hallucinations.  He does not want to go to a psychiatric facility.  [facility] has already evaluated the patient.  When asking what we can do to help the patient, he states maybe some Zoloft.  He states was last on this 3 years ago.    The history is provided by the patient.       Allergies   Allergen Reactions   • Azithromycin Other (See Comments)     seizures       Discharge Medication List as of 7/2/2022 11:44 PM      Prior to Admission Medications    Details   ibuprofen (MOTRIN) 800 MG tablet Take 1 tablet by mouth every 8 hours as needed for Pain.Eprescribe, Disp-20 tablet, R-0      albuterol 108 (90 Base) MCG/ACT inhaler Inhale 2 puffs into the lungs every 4 hours as needed for Shortness of Breath or Wheezing.Eprescribe, Disp-8.5 g, R-0      Spacer/Aero-Holding Chambers Device For use with albuterolDisp-1 each, R-0, Eprescribe             No past medical history on file.    No past surgical history on file.    No family history  on file.    Social History     Tobacco Use   • Smoking status: Current Every Day Smoker   • Smokeless tobacco: Never Used   • Tobacco comment: vapes   Substance Use Topics   • Alcohol use: Yes     Comment: ETOH currently on board   • Drug use: Not Currently       E-cigarette/Vaping     E-Cigarette/Vaping Substances & Devices   • Nicotine Yes        Review of Systems   Unable to perform ROS: Psychiatric disorder       Physical Exam     ED Triage Vitals   ED Triage Vitals Group      Temp       Pulse       Resp       BP       SpO2       EtCO2 mmHg       Height       Weight       Weight Scale Used       BMI (Calculated)       IBW/kg (Calculated)        Physical Exam  Vitals and nursing note reviewed.   Constitutional:       Appearance: He is not ill-appearing, toxic-appearing or diaphoretic.   HENT:      Head: Normocephalic and atraumatic.      Right Ear: External ear normal.      Left Ear: External ear normal.      Nose: Nose normal. No congestion.      Mouth/Throat:      Mouth: Mucous membranes are moist.   Eyes:      General:         Right eye: No discharge.         Left eye: No discharge.      Conjunctiva/sclera: Conjunctivae normal.   Cardiovascular:      Rate and Rhythm: Normal rate and regular rhythm.   Pulmonary:      Effort: Pulmonary effort is normal. No respiratory distress.      Breath sounds: Normal breath sounds. No wheezing, rhonchi or rales.   Abdominal:      General: There is no distension.      Palpations: Abdomen is soft. There is no mass.      Tenderness: There is no abdominal tenderness. There is no guarding or rebound.   Musculoskeletal:         General: No deformity or signs of injury.      Cervical back: Neck supple.   Skin:     General: Skin is warm and dry.   Neurological:      Mental Status: He is alert and oriented to person, place, and time.   Psychiatric:      Comments: Calm and cooperative, good eye contact         ED Course     Procedures    Lab Results     Results for orders placed or  rate.   Pulmonary:      Effort: Pulmonary effort is normal.   Abdominal:      Comments: Soft, nontender, nondistended.  Small fat-containing umbilical hernia   Skin:     General: Skin is warm and dry.   Neurological:      Mental Status: He is alert and oriented to person, place, and time.   Psychiatric:         Mood and Affect: Mood normal.         Behavior: Behavior normal.              Assessment/Plan   Problem List Items Addressed This Visit        Other    Umbilical hernia - Primary    Relevant Orders    Case request operating room: HERNIA UMBILICAL REPAIR (Completed)    Electrocardiogram, 12-lead    COVID-19 PAT/Pre-Procedural        45-year-old male presenting with a symptomatic umbilical hernia.  Given his very active lifestyle he is interested in an expeditious repair.  We will plan to do this as an open umbilical hernia repair with mesh on a date that is convenient for him.  I did  him that there is no urgency to this repair.  The only indications for repairing this in an emergent fashion would be if he presented with obstructive symptoms or evidence of incarceration, for which he should come to the emergency department.  He does plan to travel to [facility] at the beginning of September and I told him that he would need to not travel for 1 week following surgery, so we will plan accordingly.    No follow-ups on file.     I spent 21 minutes on this date of service performing the following activities: obtaining history, performing examination, entering orders, documenting, preparing for visit, obtaining / reviewing records and providing counseling and education.         Natalie Hernandez MD   performed during the hospital encounter of 07/02/22   Comprehensive Metabolic Panel   Result Value Ref Range    Fasting Status      Sodium 139 135 - 145 mmol/L    Potassium 3.6 3.4 - 5.1 mmol/L    Chloride 106 97 - 110 mmol/L    Carbon Dioxide 24 21 - 32 mmol/L    Anion Gap 13 7 - 19 mmol/L    Glucose 105 (H) 70 - 99 mg/dL    BUN 11 6 - 20 mg/dL    Creatinine 0.90 0.67 - 1.17 mg/dL    Glomerular Filtration Rate >90 >=60    BUN/ Creatinine Ratio 12 7 - 25    Calcium 8.9 8.4 - 10.2 mg/dL    Bilirubin, Total 0.4 0.2 - 1.0 mg/dL    GOT/AST 32 <=37 Units/L    GPT/ALT 38 <64 Units/L    Alkaline Phosphatase 87 45 - 117 Units/L    Albumin 4.3 3.6 - 5.1 g/dL    Protein, Total 7.7 6.4 - 8.2 g/dL    Globulin 3.4 2.0 - 4.0 g/dL    A/G Ratio 1.3 1.0 - 2.4   Alcohol   Result Value Ref Range    Alcohol 191 (H) <3 mg/dL   Drug Abuse Screen, Urine   Result Value Ref Range    Amphetamines, Urine Negative Negative    Barbiturates, Urine Negative Negative    Benzodiazepines, Urine Negative Negative    Cocaine/ Metabolite, Urine Negative Negative    Opiates, Urine Negative Negative    Phencyclidine, Urine Negative Negative    Cannabinoids, Urine Negative Negative   Acetaminophen Level   Result Value Ref Range    Acetaminophen <2 (L) 10 - 30 mcg/mL   Salicylate Level   Result Value Ref Range    Salicylate <2.8 <=30.0 mg/dL   CBC with Automated Differential (performable only)   Result Value Ref Range    WBC 7.2 4.2 - 11.0 K/mcL    RBC 4.66 4.50 - 5.90 mil/mcL    HGB 14.9 13.0 - 17.0 g/dL    HCT 42.4 39.0 - 51.0 %    MCV 91.0 78.0 - 100.0 fl    MCH 32.0 26.0 - 34.0 pg    MCHC 35.1 32.0 - 36.5 g/dL    RDW-CV 12.6 11.0 - 15.0 %    RDW-SD 41.1 39.0 - 50.0 fL     140 - 450 K/mcL    NRBC 0 <=0 /100 WBC    Neutrophil, Percent 55 %    Lymphocytes, Percent 33 %    Mono, Percent 6 %    Eosinophils, Percent 5 %    Basophils, Percent 1 %    Immature Granulocytes 0 %    Absolute Neutrophils 3.9 1.8 - 7.7 K/mcL    Absolute Lymphocytes 2.4 1.0 -  4.8 K/mcL    Absolute Monocytes 0.4 0.3 - 0.9 K/mcL    Absolute Eosinophils  0.4 0.0 - 0.5 K/mcL    Absolute Basophils 0.1 0.0 - 0.3 K/mcL    Absolute Immmature Granulocytes 0.0 0.0 - 0.2 K/mcL       Radiology Results     Imaging Results    None         ED Medication Orders (From admission, onward)    None               MDM  Number of Diagnoses or Management Options  Suicidal ideation  Diagnosis management comments: 21-year-old male presents with a complaint of possible suicidal ideations as stated in the HPI.  Vital signs are normal.  Patient does not want to go electively to a psychiatric facility.  [facility] has been consult to and evaluated the patient, will follow up on this.  Police currently at bedside.    2339 - [facility] states there is not enough to place an emergency USP on the patient.  The patient still does not want to go to a psychiatric facility.  To me, the patient does seem like high suicide risk based off his actions and redemonstration of the same complaint within 1 week.  Discussed concerns with the officer at bedside as well as the [facility] worker.  Holding the patient in the ER at this point would be assault.  Will discharge the patient with the  who plans on stopping it back up home.  There is reportedly a safety plan in place.  I did place a behavior health consult.      Clinical Impression     ED Diagnosis   1. Suicidal ideation  SERVICE TO BEHAVIORAL HEALTH       Disposition        Discharge 7/2/2022 11:42 PM  Raymond Haas discharge to home/self care.                         Karlos Khalil, DO  07/03/22 0005

## 2022-10-21 ENCOUNTER — OFFICE VISIT (OUTPATIENT)
Dept: FAMILY MEDICINE | Facility: CLINIC | Age: 47
End: 2022-10-21
Payer: COMMERCIAL

## 2022-10-21 VITALS
TEMPERATURE: 97.5 F | HEART RATE: 76 BPM | HEIGHT: 74 IN | DIASTOLIC BLOOD PRESSURE: 90 MMHG | WEIGHT: 279.94 LBS | BODY MASS INDEX: 35.93 KG/M2 | SYSTOLIC BLOOD PRESSURE: 129 MMHG | OXYGEN SATURATION: 98 %

## 2022-10-21 DIAGNOSIS — F90.2 ATTENTION DEFICIT HYPERACTIVITY DISORDER (ADHD), COMBINED TYPE: Primary | ICD-10-CM

## 2022-10-21 PROCEDURE — 90686 IIV4 VACC NO PRSV 0.5 ML IM: CPT | Performed by: FAMILY MEDICINE

## 2022-10-21 PROCEDURE — 90471 IMMUNIZATION ADMIN: CPT | Performed by: FAMILY MEDICINE

## 2022-10-21 PROCEDURE — 99213 OFFICE O/P EST LOW 20 MIN: CPT | Mod: 25 | Performed by: FAMILY MEDICINE

## 2022-10-21 PROCEDURE — 3008F BODY MASS INDEX DOCD: CPT | Performed by: FAMILY MEDICINE

## 2022-10-21 RX ORDER — HYDROCORTISONE 25 MG/G
CREAM TOPICAL 2 TIMES DAILY
Qty: 60 G | Refills: 1 | Status: SHIPPED | OUTPATIENT
Start: 2022-10-21 | End: 2023-06-01

## 2022-10-21 ASSESSMENT — ENCOUNTER SYMPTOMS
SHORTNESS OF BREATH: 0
FATIGUE: 0

## 2022-10-21 NOTE — PROGRESS NOTES
[place] [address]  [phone]       Reason for visit:   Chief Complaint   Patient presents with    Annual Exam      HPI   Phill Brown is a 47 y.o. male who presents with ADHD. He is compliant with medications.       Past Medical History:   Diagnosis Date    ADHD     Asthma     COVID-19 vaccine series completed      Past Surgical History:   Procedure Laterality Date    APPENDECTOMY      EPIDURAL BLOCK INJECTION      times 2 on Back     HERNIA REPAIR      WISDOM TOOTH EXTRACTION       Social History     Tobacco Use    Smoking status: Never    Smokeless tobacco: Never   Vaping Use    Vaping Use: Never used   Substance Use Topics    Alcohol use: Yes     Comment: very rare    Drug use: No      Social History     Social History Narrative    Do you wear your seatbelt? Yes    Do you have smoke detector in your home? Yes    Do you have a carbon monoxide detector in your home? Yes    Current Occupation?    Current Marital Status?      Family History   Problem Relation Age of Onset    Prostate cancer Biological Father      Penicillins  Current Outpatient Medications   Medication Sig Dispense Refill    albuterol HFA (VENTOLIN HFA) 90 mcg/actuation inhaler Inhale 2 puffs every 6 (six) hours as needed for wheezing. 18 g 3    FLOVENT HFA 44 mcg/actuation inhaler INHALE 2 PUFF (INHALATION) 2 TIMES PER DAY FOR 30 DAYS 10.6 g 3    hydrocortisone (ANUSOL-HC) 2.5 % rectal cream Insert into the rectum 2 (two) times a day. 60 g 1    melatonin 3 mg tablet Take 3 mg by mouth nightly.      methylphenidate HCl 36 mg ER tablet Take 1 tablet (36 mg total) by mouth daily. 90 tablet 0    multivit with minerals/lutein (MULTIVITAMIN 50 PLUS ORAL) No SIG Entered      NON FORMULARY MEDICATION REQUEST quadriden ointment antibiotic for bug bites       No current facility-administered medications for this visit.       Patient Active  "Problem List    Diagnosis Date Noted    Umbilical hernia 08/17/2021    Dengue fever 05/06/2019    Preventative health care 11/20/2018    Family history of coronary artery disease 11/19/2018    ADHD (attention deficit hyperactivity disorder) 10/24/2017    Asthma 04/26/2016         Review of Systems   Constitutional: Negative for fatigue.   Respiratory: Negative for shortness of breath.    Cardiovascular: Negative for chest pain.     Objective   Vitals:    10/21/22 1409   BP: 129/90   BP Location: Right upper arm   Patient Position: Sitting   Pulse: 76   Temp: 36.4 °C (97.5 °F)   TempSrc: Oral   SpO2: 98%   Weight: 127 kg (279 lb 15 oz)   Height: 1.88 m (6' 2\")     Body mass index is 35.94 kg/m².  Physical Exam  Constitutional:       Appearance: Normal appearance.   Cardiovascular:      Rate and Rhythm: Normal rate and regular rhythm.      Pulses: Normal pulses.      Heart sounds: Normal heart sounds.   Pulmonary:      Effort: Pulmonary effort is normal.      Breath sounds: Normal breath sounds.   Neurological:      Mental Status: He is alert.   Psychiatric:         Mood and Affect: Mood normal.         Behavior: Behavior normal.         Thought Content: Thought content normal.         Judgment: Judgment normal.         Procedures    Lab Results   Component Value Date    WBC 6.1 05/02/2022    HGB 17.0 05/02/2022    HCT 50.3 05/02/2022     05/02/2022    CHOL 197 05/02/2022    TRIG 113 05/02/2022    HDL 58 05/02/2022    LDLCALC 119 (H) 05/02/2022    ALT 35 05/02/2022    AST 24 05/02/2022     05/02/2022    K 4.3 05/02/2022     05/02/2022    CREATININE 1.11 05/02/2022    BUN 17 05/02/2022    CO2 21 05/02/2022    PSA 0.8 05/02/2022    GLUCOSE 103 (H) 05/02/2022           Assessment   Problem List Items Addressed This Visit        Mental Health    ADHD (attention deficit hyperactivity disorder) - Primary     Doing well  Continue meds  Flu vax                Harry A. Frankel, MD  10/21/2022 "

## 2023-01-04 ENCOUNTER — OFFICE VISIT (OUTPATIENT)
Dept: BARIATRICS | Facility: CLINIC | Age: 48
End: 2023-01-04
Payer: COMMERCIAL

## 2023-01-04 VITALS
BODY MASS INDEX: 36.56 KG/M2 | HEIGHT: 74 IN | OXYGEN SATURATION: 97 % | DIASTOLIC BLOOD PRESSURE: 80 MMHG | HEART RATE: 78 BPM | WEIGHT: 284.83 LBS | SYSTOLIC BLOOD PRESSURE: 117 MMHG | TEMPERATURE: 97.9 F

## 2023-01-04 DIAGNOSIS — R10.10 PAIN OF UPPER ABDOMEN: Primary | ICD-10-CM

## 2023-01-04 PROBLEM — K42.9 UMBILICAL HERNIA: Status: RESOLVED | Noted: 2021-08-17 | Resolved: 2023-01-04

## 2023-01-04 PROCEDURE — 3008F BODY MASS INDEX DOCD: CPT | Performed by: SURGERY

## 2023-01-04 PROCEDURE — 99213 OFFICE O/P EST LOW 20 MIN: CPT | Performed by: SURGERY

## 2023-01-04 ASSESSMENT — ENCOUNTER SYMPTOMS: ABDOMINAL PAIN: 1

## 2023-01-04 NOTE — PROGRESS NOTES
Patient ID: Phill Brown                              : 1975  MRN: [id number]                                            Visit Date: 2023  Encounter Provider: Natalie Hernandez  Referring Provider: Natalie Hernandez MD    Subjective   Chief Complaint: Follow-up    Phill Brown is a 47 y.o. old male presenting today for evaluation of intermittent abdominal discomfort as well as cayden-anal itching. He has for the most part he has been doing well since his umbilical hernia repair, however he no longer does heavy deadlifts (>300lb). He has on occasion what he describes as discomfort in a band across his abdomen. He also had an episode after eating lobster bisque of intense pressure/pain in his upper abdomen which lasted for a couple of hours and then passed. He is concerned that with his frequent travels to [place] that he may have acquired a parasite or other GI infection since he has had a change in bowel habits and increased gassiness. He did have one episode of small volume drainage from the umbilicus but this has since resolved. He also complains of perianal itching for which he has been using hemorrhoid cream and medicated wipes.     Medications:   Current Outpatient Medications:   •  albuterol HFA (VENTOLIN HFA) 90 mcg/actuation inhaler, Inhale 2 puffs every 6 (six) hours as needed for wheezing., Disp: 18 g, Rfl: 3  •  hydrocortisone (ANUSOL-HC) 2.5 % rectal cream, Insert into the rectum 2 (two) times a day., Disp: 60 g, Rfl: 1  •  melatonin 3 mg tablet, Take 3 mg by mouth nightly., Disp: , Rfl:   •  FLOVENT HFA 44 mcg/actuation inhaler, INHALE 2 PUFF (INHALATION) 2 TIMES PER DAY FOR 30 DAYS (Patient not taking: Reported on 2023), Disp: 10.6 g, Rfl: 3  •  methylphenidate HCl 36 mg ER tablet, Take 1 tablet (36 mg total) by mouth daily. (Patient not taking: Reported on 2023), Disp: 90 tablet, Rfl: 0  •  multivit with minerals/lutein (MULTIVITAMIN 50 PLUS ORAL), No SIG Entered, Disp: , Rfl:  "  •  NON FORMULARY MEDICATION REQUEST, quadriden ointment antibiotic for bug bites, Disp: , Rfl:     Past Medical History:  has a past medical history of ADHD, Asthma, Constipation, and COVID-19 vaccine series completed.  Past Surgical History:  has a past surgical history that includes Appendectomy; [place] tooth extraction; Epidural block injection; Hernia repair; and Umbilical hernia repair (09/2021).  Social History:   Social History     Tobacco Use   • Smoking status: Never   • Smokeless tobacco: Never   Vaping Use   • Vaping Use: Never used   Substance Use Topics   • Alcohol use: Yes     Comment: very rare   • Drug use: No     Family History: family history includes Prostate cancer in his biological father.  Allergies: is allergic to penicillins.     Review of Systems   Gastrointestinal: Positive for abdominal pain.     The following have been reviewed and updated as appropriate in this visit:          Objective   Vitals:   Visit Vitals  /80   Pulse 78   Temp 36.6 °C (97.9 °F)   Ht 1.88 m (6' 2\")   Wt 129 kg (284 lb 13.4 oz)   SpO2 97%   BMI 36.57 kg/m²     Physical Exam  Vitals reviewed.   HENT:      Head: Normocephalic and atraumatic.   Cardiovascular:      Rate and Rhythm: Normal rate.   Pulmonary:      Effort: Pulmonary effort is normal.   Abdominal:      General: There is no distension.      Palpations: Abdomen is soft.      Tenderness: There is no abdominal tenderness.      Comments: Well healed infraumbilical incision, no evidence of recurrent hernia, no erythema/drainage/skin changes   Skin:     General: Skin is warm and dry.   Neurological:      Mental Status: He is alert.              Assessment/Plan   Problem List Items Addressed This Visit    None    47M presenting with change in bowel function and intermittent abdominal pain as well as some food-related GI discomfort. I counseled him that in terms of the discomfort, that I would be surprised if it is related to the hernia repair this long " after surgery. I see no evidence of skin breakdown , drainage, or infection on exam. I encouraged him to return to his normal workout routines as tolerated, including heavy lifting if he wants to. For the cayden-anal itching, he has previously been diagnosed with hemorrhoids. I counseled him to incorporate a fiber supplement into his diet, as well as to avoid the medicated cleaning wipes as they can irritate the anoderm and dry out the skin. I have referred him to see Dr. Francis with [facility] for his concern of intestinal parasite and GI dysfunction. He will follow up with me on an as-needed basis.     No follow-ups on file.       Natalie Hernandez MD     I spent 24 minutes on this date of service performing the following activities: obtaining history, performing examination, documenting, preparing for visit, providing counseling and education and coordinating care.

## 2023-05-22 ENCOUNTER — OFFICE VISIT (OUTPATIENT)
Dept: FAMILY MEDICINE | Facility: CLINIC | Age: 48
End: 2023-05-22
Payer: COMMERCIAL

## 2023-05-22 ENCOUNTER — TELEPHONE (OUTPATIENT)
Dept: SCHEDULING | Facility: CLINIC | Age: 48
End: 2023-05-22
Payer: COMMERCIAL

## 2023-05-22 VITALS
HEIGHT: 74 IN | OXYGEN SATURATION: 98 % | TEMPERATURE: 97.8 F | DIASTOLIC BLOOD PRESSURE: 88 MMHG | WEIGHT: 280.2 LBS | BODY MASS INDEX: 35.96 KG/M2 | SYSTOLIC BLOOD PRESSURE: 120 MMHG | HEART RATE: 65 BPM

## 2023-05-22 DIAGNOSIS — F90.2 ATTENTION DEFICIT HYPERACTIVITY DISORDER (ADHD), COMBINED TYPE: Primary | ICD-10-CM

## 2023-05-22 DIAGNOSIS — J45.20 MILD INTERMITTENT ASTHMA WITHOUT COMPLICATION: ICD-10-CM

## 2023-05-22 DIAGNOSIS — R07.89 OTHER CHEST PAIN: ICD-10-CM

## 2023-05-22 PROCEDURE — 99214 OFFICE O/P EST MOD 30 MIN: CPT | Performed by: FAMILY MEDICINE

## 2023-05-22 PROCEDURE — 3008F BODY MASS INDEX DOCD: CPT | Performed by: FAMILY MEDICINE

## 2023-05-22 RX ORDER — CEPHALEXIN 250 MG/1
1 CAPSULE ORAL DAILY PRN
COMMUNITY
Start: 2023-05-13 | End: 2023-08-25 | Stop reason: SDUPTHER

## 2023-05-22 ASSESSMENT — ENCOUNTER SYMPTOMS
SHORTNESS OF BREATH: 0
FATIGUE: 0
NERVOUS/ANXIOUS: 1
DECREASED CONCENTRATION: 1

## 2023-05-22 NOTE — PROGRESS NOTES
[place] [address]  [phone]       Reason for visit:   Chief Complaint   Patient presents with   • follow up      HPI   Phill Brown is a 47 y.o. male who presents with ADHD, asthma and feels a little anxious. His anxiety has dissipated with exercise and rest. His ADHD is well controlled with Concerta. His asthma flared in [place] which he thinksmay be related to work and personal anxiety.  He has episode of chest pain in [place] and was evaluated. He relates this to anxiety.       Past Medical History:   Diagnosis Date   • ADHD    • Asthma    • Constipation    • COVID-19 vaccine series completed    • Umbilical hernia 8/17/2021     Past Surgical History:   Procedure Laterality Date   • APPENDECTOMY     • EPIDURAL BLOCK INJECTION      times 2 on Back    • HERNIA REPAIR     • UMBILICAL HERNIA REPAIR  09/2021   • WISDOM TOOTH EXTRACTION       Social History     Tobacco Use   • Smoking status: Never   • Smokeless tobacco: Never   Vaping Use   • Vaping status: Never Used   Substance Use Topics   • Alcohol use: Yes     Comment: very rare   • Drug use: No      Social History     Social History Narrative    Do you wear your seatbelt? Yes    Do you have smoke detector in your home? Yes    Do you have a carbon monoxide detector in your home? Yes    Current Occupation?    Current Marital Status?      Family History   Problem Relation Age of Onset   • Prostate cancer Biological Father      Penicillins  Current Outpatient Medications   Medication Sig Dispense Refill   • ADVAIR DISKUS 100-50 mcg/dose diskus inhaler Inhale 1 puff 2 (two) times a day.     • albuterol HFA (VENTOLIN HFA) 90 mcg/actuation inhaler Inhale 2 puffs every 6 (six) hours as needed for wheezing. 18 g 3   • LORazepam (ATIVAN) 1 mg tablet Take 1 pill 30 minutes prior to air flight 1 tablet 0   • methylphenidate HCl 36 mg ER tablet Take 1 tablet (36 mg total) by  "mouth every morning. 90 tablet 0   • multivit with minerals/lutein (MULTIVITAMIN 50 PLUS ORAL) No SIG Entered     • hydrocortisone (ANUSOL-HC) 2.5 % rectal cream Insert into the rectum 2 (two) times a day. 60 g 1   • melatonin 3 mg tablet Take 3 mg by mouth nightly.     • NON FORMULARY MEDICATION REQUEST quadriden ointment antibiotic for bug bites       No current facility-administered medications for this visit.       Patient Active Problem List    Diagnosis Date Noted   • Pain of upper abdomen 01/04/2023   • Dengue fever 05/06/2019   • Preventative health care 11/20/2018   • Family history of coronary artery disease 11/19/2018   • ADHD (attention deficit hyperactivity disorder) 10/24/2017   • Other chest pain 04/27/2016   • Asthma 04/26/2016         Review of Systems   Constitutional: Negative for fatigue.   Respiratory: Negative for shortness of breath.    Cardiovascular: Positive for chest pain.   Psychiatric/Behavioral: Positive for decreased concentration. The patient is nervous/anxious.      Objective   Vitals:    05/22/23 1005   BP: 120/88   BP Location: Left upper arm   Patient Position: Sitting   Pulse: 65   Temp: 36.6 °C (97.8 °F)   TempSrc: Oral   SpO2: 98%   Weight: 127 kg (280 lb 3.2 oz)   Height: 1.88 m (6' 2\")     Body mass index is 35.98 kg/m².  Physical Exam  Constitutional:       Appearance: Normal appearance.   Cardiovascular:      Rate and Rhythm: Normal rate and regular rhythm.      Pulses: Normal pulses.      Heart sounds: Normal heart sounds.   Pulmonary:      Effort: Pulmonary effort is normal.      Breath sounds: Normal breath sounds.   Neurological:      Mental Status: He is alert.         Procedures    Lab Results   Component Value Date    WBC 6.1 05/02/2022    HGB 17.0 05/02/2022    HCT 50.3 05/02/2022     05/02/2022    CHOL 197 05/02/2022    TRIG 113 05/02/2022    HDL 58 05/02/2022    LDLCALC 119 (H) 05/02/2022    ALT 35 05/02/2022    AST 24 05/02/2022     05/02/2022    K " 4.3 05/02/2022     05/02/2022    CREATININE 1.11 05/02/2022    BUN 17 05/02/2022    CO2 21 05/02/2022    PSA 0.8 05/02/2022    GLUCOSE 103 (H) 05/02/2022           Assessment   Problem List Items Addressed This Visit        Respiratory    Asthma     Stable  Continue meds         Relevant Medications    ADVAIR DISKUS 100-50 mcg/dose diskus inhaler       Mental Health    ADHD (attention deficit hyperactivity disorder) - Primary     Stable  Continue meds  Labs reviewed            Other    Other chest pain     Referred to Dr Paige         Relevant Orders    Ambulatory referral to Cardiology           Harry A. Frankel, MD  5/22/2023

## 2023-05-22 NOTE — TELEPHONE ENCOUNTER
New Patient Appointment Request    Name of caller: Tc    Reason for Visit: Chest Pain    Insurance: [employer]     Recent Cardiac Test/Procedures: None     Referred by: PCP    Primary Care Physician: Dr. Frankel     Previous Cardiologist name and phone number: None    Best contact number: [phone]    Additional notes/History: Pt states he will be out of town in two weeks and wants an apt before he leaves per his PCP, pt wants [facility] or [facility]

## 2023-05-23 NOTE — TELEPHONE ENCOUNTER
Pt is seeking a new pt appointment in 2 weeks. He leaves the country then and wont be back for a year.   Can we accommodate?

## 2023-05-23 NOTE — TELEPHONE ENCOUNTER
Left a message for patient to call back to schedule.  Unfortunately Dr. Ferrara and Dr. Dhillon have nothing until July.  Patient told me he was going away in 2 weeks, and would be gone for 2 weeks.

## 2023-05-23 NOTE — TELEPHONE ENCOUNTER
I just requested that that slot be given to hospital follow-up from the [facility].

## 2023-06-01 ENCOUNTER — TELEPHONE (OUTPATIENT)
Dept: CARDIOLOGY | Facility: CLINIC | Age: 48
End: 2023-06-01

## 2023-06-01 ENCOUNTER — OFFICE VISIT (OUTPATIENT)
Dept: CARDIOLOGY | Facility: CLINIC | Age: 48
End: 2023-06-01
Payer: COMMERCIAL

## 2023-06-01 VITALS
HEIGHT: 74 IN | HEART RATE: 73 BPM | OXYGEN SATURATION: 99 % | SYSTOLIC BLOOD PRESSURE: 128 MMHG | BODY MASS INDEX: 36.06 KG/M2 | DIASTOLIC BLOOD PRESSURE: 82 MMHG | WEIGHT: 281 LBS | RESPIRATION RATE: 16 BRPM

## 2023-06-01 DIAGNOSIS — R94.31 ABNORMAL EKG: ICD-10-CM

## 2023-06-01 DIAGNOSIS — R06.02 SHORTNESS OF BREATH: Primary | ICD-10-CM

## 2023-06-01 DIAGNOSIS — Z82.49 FAMILY HISTORY OF CORONARY ARTERY DISEASE: ICD-10-CM

## 2023-06-01 DIAGNOSIS — R07.89 OTHER CHEST PAIN: ICD-10-CM

## 2023-06-01 DIAGNOSIS — J45.902 ASTHMA WITH STATUS ASTHMATICUS, UNSPECIFIED ASTHMA SEVERITY, UNSPECIFIED WHETHER PERSISTENT: ICD-10-CM

## 2023-06-01 PROCEDURE — 3008F BODY MASS INDEX DOCD: CPT | Performed by: INTERNAL MEDICINE

## 2023-06-01 PROCEDURE — 93000 ELECTROCARDIOGRAM COMPLETE: CPT | Performed by: INTERNAL MEDICINE

## 2023-06-01 PROCEDURE — 99204 OFFICE O/P NEW MOD 45 MIN: CPT | Performed by: INTERNAL MEDICINE

## 2023-06-01 NOTE — TELEPHONE ENCOUNTER
Our pre-cert team does not handle any pre-certs scheduled within 5 days of the test being scheduled.     This should be handled by the front office. Test is scheduled for tomorrow 6/2/23.    Thank You!

## 2023-06-01 NOTE — LETTER
June 1, 2023     Harry A Frankel, MD  [address] [place] [id number]    Patient: Phill Brown  YOB: 1975  Date of Visit: 6/1/2023      Dear Dr. Frankel:    Thank you for referring Phill Brown to me for evaluation. Below are my notes for this consultation.    If you have questions, please do not hesitate to call me. I look forward to following your patient along with you.         Sincerely,        Genna Sandoval MD        CC: No Recipients    Genna Zuñiga MD  6/1/2023  6:02 PM  Signed                       Cardiology Consult/New Patient     Patient ID: Phill Brown 47 y.o. male. 1975  PCP: Frankel, Harry A, MD   History Present Illness     Phill Brown presents to the office today for evaluation of recent onset shortness of breath and some feelings of chest pressure.  He is a 47-year-old  male who splits his time between [place] and [place].  During his last visit to [place], he says that he was on his way to do presentation and he started feeling short of breath.  He went to a physician in [place] who performed lab work and some other testing, telling him that it was most likely due to anxiety.    Subsequently he went to [place] for a short time and was told to relax for a few days and he admits that he felt much better.  He had an anxiety attack while he was in the airport, but he recognized that this was anxiety as opposed to something else.    His first episode in [place] was the worst-he felt like he could not breathe and his chest was tight.  He says he has had some other minor episodes and was given a prescription for a low-dose of lorazepam to take prior to flying, but is trying to avoid using this is much as possible.  Mr. 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  He travels quite a bit and admits that this can be difficult.    He has no  personal history of heart disease, myocardial infarction, TIA, CVA, DVT or pulmonary embolism.  He exercises regularly and has been a lifelong exerciser-he does a combination of weightlifting, cardio and yoga.  He says he has never used any enhancing substances or illicit drugs.  He tries to follow a healthy diet but says that he knows he needs to lose weight.  He snores on occasion but has not had a formal sleep study.    Several years ago he had a stress echo performed which was unremarkable.    All other systems reviewed and negative except as noted in HPI.  Past History     Past Medical History:   Diagnosis Date   • ADHD    • Asthma    • Constipation    • COVID-19 vaccine series completed    • Umbilical hernia 8/17/2021     Past Surgical History:   Procedure Laterality Date   • APPENDECTOMY     • EPIDURAL BLOCK INJECTION      times 2 on Back    • HERNIA REPAIR     • UMBILICAL HERNIA REPAIR  09/2021   • WISDOM TOOTH EXTRACTION       Social History     Tobacco Use   • Smoking status: Never     Passive exposure: Never   • Smokeless tobacco: Never   Vaping Use   • Vaping status: Never Used     Passive vaping exposure: Yes   Substance Use Topics   • Alcohol use: Yes     Comment: very rare 1x a month   • Drug use: Never     Family History   Problem Relation Age of Onset   • Heart attack Biological Mother 65   • Anxiety disorder Biological Mother    • Depression Biological Mother    • Prostate cancer Biological Father      Allergies/Medications   Allergies: Penicillins    Current Outpatient Medications:  •  ADVAIR DISKUS, Inhale 1 puff daily as needed.  •  albuterol HFA, Inhale 2 puffs every 6 (six) hours as needed for wheezing.  •  hydrocortisone, Insert into the rectum 2 (two) times a day.  •  methylphenidate HCl, Take 1 tablet (36 mg total) by mouth every morning.  •  multivit with minerals/lutein (MULTIVITAMIN 50 PLUS ORAL), No SIG Entered  •  NON FORMULARY MEDICATION REQUEST, quadriden ointment antibiotic for bug  "bites     Vitals:    06/01/23 0933 06/01/23 1017 06/01/23 1018   BP: 122/84 138/82 128/82   BP Location: Left upper arm Right upper arm Left upper arm   Patient Position: Sitting     Pulse: 73     Resp: 16     SpO2: 99%     Weight: 127 kg (281 lb)     Height: 1.88 m (6' 2\")       Physical Exam:  Constitutional: Well-nourished. No apparent distress.  Muscular build.  Eyes: No icterus.  ENT: Mucosa moist.  Neck: Supple. No jugular venous distention. No carotid bruits.  Cardiac: Normal S1 and S2, regular rate and rhythm, no S3.  No rub. No ectopy.  Lungs: Clear to auscultation bilaterally.  No wheezing.  Abdomen: Soft, nontender, positive normoactive bowel sounds.   Extremities: No clubbing or cyanosis.  No calf tenderness.  No edema.  Distal pulses intact.  Skin: Warm and dry.  No jaundice.  Musculoskeletal: No joint swelling or erythema.  Neurologic: Awake, alert, oriented.  Moving all extremities.  Psychiatric: No agitation.  Looks anxious in the office and admits that its been a difficult day.    Labs/ Imaging/Procedures   Labs  Lab Results   Component Value Date    CHOL 197 05/02/2022    CHOL 214 (H) 08/26/2021     Lab Results   Component Value Date    HDL 58 05/02/2022    HDL 59 08/26/2021     Lab Results   Component Value Date    LDLCALC 119 (H) 05/02/2022    LDLCALC 135 (H) 08/26/2021     Lab Results   Component Value Date    TRIG 113 05/02/2022    TRIG 114 08/26/2021     Lab Results   Component Value Date    WBC 6.1 05/02/2022    HGB 17.0 05/02/2022     05/02/2022     Lab Results   Component Value Date    GLUCOSE 103 (H) 05/02/2022     05/02/2022    K 4.3 05/02/2022    CO2 21 05/02/2022     05/02/2022    BUN 17 05/02/2022    CREATININE 1.11 05/02/2022     Imaging  CT calcium score 11/9/2020: Total score 9, all of which was in the LAD.    Exercise stress echo 5/20/2016: Patient exercised 9 minutes on a Bartolome protocol.  Normal LV function at rest with LVEF 60-65%.  Normal wall motion.  Normal " RV size and function.  No pericardial effusion.  Valves are grossly normal with trace MR.  Trace TR.  Immediately following exercise, all segments become hyperdynamic.  No new wall motion abnormalities noted.    EKG  Assessment/Plan     1. Shortness of breath  He has no evidence of volume overload and his blood pressure is reasonably controlled.  I would not want to to be any higher, but he admits that he has been under considerable stress this morning.  I asked him to return for an exercise stress echocardiogram.  He should continue with his anxiolytics.  I agree that he would benefit from weight loss.  - Echocardiogram stress test; Future    2. Other chest pain  Intermittent chest pain as described.  This tends to occur when he short of breath or under stress.  He has not had to change any of his workouts.  Stress echo ordered.    3. Asthma with status asthmaticus, unspecified asthma severity, unspecified whether persistent  History of asthma but he feels that these recent episodes are different than his usual asthma symptoms.  Work-up in process.    4. Family history of coronary artery disease  Continue with aggressive risk factor modification.  His CT calcium score was 9 less than 2 years ago.  He asked if he needed to have this performed again, but I told him it would not change his current therapies.  Even if his calcium score has increased, we would still perform a stress echo for his symptoms.  He has not had any recent blood work here, but says it was done in [place].  Since his score is under 100, he does not need daily aspirin.  Continue with healthy lifestyle habits.    5. Abnormal EKG  I suspect that this is early repolarization from his longstanding athleticism.  Echocardiogram ordered.    Return if symptoms worsen or fail to improve.  I will follow-up with him via telephone or portal before he returns to [place].  I spent 46 minutes on the date of service performing the following activities: obtaining  history, performing examination, entering orders, documenting, preparing for visit, obtaining / reviewing available records, providing counseling and education and communicating results.  Thank you for allowing me to participate in the care of this patient.  I hope this information is helpful.  Please do not hesitate to contact me with any questions or concerns.      Genna Sandoval MD [facility], [facility]  6/1/2023

## 2023-06-01 NOTE — PROGRESS NOTES
Cardiology Consult/New Patient     Patient ID: Phill Brown 47 y.o. male. 1975  PCP: Frankel, Harry A, MD   History Present Illness     Phill Brown presents to the office today for evaluation of recent onset shortness of breath and some feelings of chest pressure.  He is a 47-year-old  male who splits his time between [place] and [place].  During his last visit to [place], he says that he was on his way to do presentation and he started feeling short of breath.  He went to a physician in [place] who performed lab work and some other testing, telling him that it was most likely due to anxiety.    Subsequently he went to [place] for a short time and was told to relax for a few days and he admits that he felt much better.  He had an anxiety attack while he was in the airport, but he recognized that this was anxiety as opposed to something else.    His first episode in [place] was the worst-he felt like he could not breathe and his chest was tight.  He says he has had some other minor episodes and was given a prescription for a low-dose of lorazepam to take prior to flying, but is trying to avoid using this is much as possible.  Mr. 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  He travels quite a bit and admits that this can be difficult.    He has no personal history of heart disease, myocardial infarction, TIA, CVA, DVT or pulmonary embolism.  He exercises regularly and has been a lifelong exerciser-he does a combination of weightlifting, cardio and yoga.  He says he has never used any enhancing substances or illicit drugs.  He tries to follow a healthy diet but says that he knows he needs to lose weight.  He snores on occasion but has not had a formal sleep study.    Several years ago he had a stress echo performed which was unremarkable.    All other systems reviewed and  "negative except as noted in HPI.  Past History     Past Medical History:   Diagnosis Date   • ADHD    • Asthma    • Constipation    • COVID-19 vaccine series completed    • Umbilical hernia 8/17/2021     Past Surgical History:   Procedure Laterality Date   • APPENDECTOMY     • EPIDURAL BLOCK INJECTION      times 2 on Back    • HERNIA REPAIR     • UMBILICAL HERNIA REPAIR  09/2021   • WISDOM TOOTH EXTRACTION       Social History     Tobacco Use   • Smoking status: Never     Passive exposure: Never   • Smokeless tobacco: Never   Vaping Use   • Vaping status: Never Used     Passive vaping exposure: Yes   Substance Use Topics   • Alcohol use: Yes     Comment: very rare 1x a month   • Drug use: Never     Family History   Problem Relation Age of Onset   • Heart attack Biological Mother 65   • Anxiety disorder Biological Mother    • Depression Biological Mother    • Prostate cancer Biological Father      Allergies/Medications   Allergies: Penicillins    Current Outpatient Medications:  •  ADVAIR DISKUS, Inhale 1 puff daily as needed.  •  albuterol HFA, Inhale 2 puffs every 6 (six) hours as needed for wheezing.  •  hydrocortisone, Insert into the rectum 2 (two) times a day.  •  methylphenidate HCl, Take 1 tablet (36 mg total) by mouth every morning.  •  multivit with minerals/lutein (MULTIVITAMIN 50 PLUS ORAL), No SIG Entered  •  NON FORMULARY MEDICATION REQUEST, quadriden ointment antibiotic for bug bites     Vitals:    06/01/23 0933 06/01/23 1017 06/01/23 1018   BP: 122/84 138/82 128/82   BP Location: Left upper arm Right upper arm Left upper arm   Patient Position: Sitting     Pulse: 73     Resp: 16     SpO2: 99%     Weight: 127 kg (281 lb)     Height: 1.88 m (6' 2\")       Physical Exam:  Constitutional: Well-nourished. No apparent distress.  Muscular build.  Eyes: No icterus.  ENT: Mucosa moist.  Neck: Supple. No jugular venous distention. No carotid bruits.  Cardiac: Normal S1 and S2, regular rate and rhythm, no S3.  No " rub. No ectopy.  Lungs: Clear to auscultation bilaterally.  No wheezing.  Abdomen: Soft, nontender, positive normoactive bowel sounds.   Extremities: No clubbing or cyanosis.  No calf tenderness.  No edema.  Distal pulses intact.  Skin: Warm and dry.  No jaundice.  Musculoskeletal: No joint swelling or erythema.  Neurologic: Awake, alert, oriented.  Moving all extremities.  Psychiatric: No agitation.  Looks anxious in the office and admits that its been a difficult day.    Labs/ Imaging/Procedures   Labs  Lab Results   Component Value Date    CHOL 197 05/02/2022    CHOL 214 (H) 08/26/2021     Lab Results   Component Value Date    HDL 58 05/02/2022    HDL 59 08/26/2021     Lab Results   Component Value Date    LDLCALC 119 (H) 05/02/2022    LDLCALC 135 (H) 08/26/2021     Lab Results   Component Value Date    TRIG 113 05/02/2022    TRIG 114 08/26/2021     Lab Results   Component Value Date    WBC 6.1 05/02/2022    HGB 17.0 05/02/2022     05/02/2022     Lab Results   Component Value Date    GLUCOSE 103 (H) 05/02/2022     05/02/2022    K 4.3 05/02/2022    CO2 21 05/02/2022     05/02/2022    BUN 17 05/02/2022    CREATININE 1.11 05/02/2022     Imaging  CT calcium score 11/9/2020: Total score 9, all of which was in the LAD.    Exercise stress echo 5/20/2016: Patient exercised 9 minutes on a Bartolome protocol.  Normal LV function at rest with LVEF 60-65%.  Normal wall motion.  Normal RV size and function.  No pericardial effusion.  Valves are grossly normal with trace MR.  Trace TR.  Immediately following exercise, all segments become hyperdynamic.  No new wall motion abnormalities noted.    EKG  Assessment/Plan     1. Shortness of breath  He has no evidence of volume overload and his blood pressure is reasonably controlled.  I would not want to to be any higher, but he admits that he has been under considerable stress this morning.  I asked him to return for an exercise stress echocardiogram.  He should  continue with his anxiolytics.  I agree that he would benefit from weight loss.  - Echocardiogram stress test; Future    2. Other chest pain  Intermittent chest pain as described.  This tends to occur when he short of breath or under stress.  He has not had to change any of his workouts.  Stress echo ordered.    3. Asthma with status asthmaticus, unspecified asthma severity, unspecified whether persistent  History of asthma but he feels that these recent episodes are different than his usual asthma symptoms.  Work-up in process.    4. Family history of coronary artery disease  Continue with aggressive risk factor modification.  His CT calcium score was 9 less than 2 years ago.  He asked if he needed to have this performed again, but I told him it would not change his current therapies.  Even if his calcium score has increased, we would still perform a stress echo for his symptoms.  He has not had any recent blood work here, but says it was done in [place].  Since his score is under 100, he does not need daily aspirin.  Continue with healthy lifestyle habits.    5. Abnormal EKG  I suspect that this is early repolarization from his longstanding athleticism.  Echocardiogram ordered.    Return if symptoms worsen or fail to improve.  I will follow-up with him via telephone or portal before he returns to [place].  I spent 46 minutes on the date of service performing the following activities: obtaining history, performing examination, entering orders, documenting, preparing for visit, obtaining / reviewing available records, providing counseling and education and communicating results.  Thank you for allowing me to participate in the care of this patient.  I hope this information is helpful.  Please do not hesitate to contact me with any questions or concerns.      Genna Sandoval MD [facility]  6/1/2023

## 2023-06-02 ENCOUNTER — HOSPITAL ENCOUNTER (OUTPATIENT)
Dept: CARDIOLOGY | Facility: CLINIC | Age: 48
Discharge: HOME | End: 2023-06-02
Attending: INTERNAL MEDICINE
Payer: COMMERCIAL

## 2023-06-02 VITALS
DIASTOLIC BLOOD PRESSURE: 88 MMHG | SYSTOLIC BLOOD PRESSURE: 130 MMHG | HEIGHT: 74 IN | BODY MASS INDEX: 35.81 KG/M2 | WEIGHT: 279 LBS

## 2023-06-02 DIAGNOSIS — R07.89 OTHER CHEST PAIN: ICD-10-CM

## 2023-06-02 DIAGNOSIS — R06.02 SHORTNESS OF BREATH: ICD-10-CM

## 2023-06-02 LAB
AORTIC ROOT ANNULUS: 3.6 CM
ASCENDING AORTA: 3.9 CM
AV PEAK GRADIENT: 4 MMHG
AV PEAK VELOCITY-S: 1.06 M/S
AV VALVE AREA: 3.6 CM2
BSA FOR ECHO PROCEDURE: 2.57 M2
E/A RATIO: 1
E/E' RATIO: 6.4
E/LAT E' RATIO: 6.3
EDV (BP): 134 CM3
EF (A4C): 62.2 %
EF A2C: 68.4 %
EJECTION FRACTION: 63.4 %
ESV (BP): 49 CM3
FRACTIONAL SHORTENING: 35.93 %
INTERVENTRICULAR SEPTUM: 1.05 CM
LA ESV (BP): 51.8 CM3
LA ESV INDEX (A2C): 25.02 CM3/M2
LA ESV INDEX (BP): 20.16 CM3/M2
LA/AORTA RATIO: 0.94
LAAS-AP2: 19.8 CM2
LAAS-AP4: 15.2 CM2
LAD 2D: 3.4 CM
LALD A4C: 4.65 CM
LALD A4C: 5.21 CM
LAV-S: 64.3 CM3
LEFT ATRIUM VOLUME INDEX: 14.59 CM3/M2
LEFT ATRIUM VOLUME: 37.5 CM3
LEFT INTERNAL DIMENSION IN SYSTOLE: 3.46 CM (ref 6.71–10.17)
LEFT VENTRICLE DIASTOLIC VOLUME INDEX: 57.98 CM3/M2
LEFT VENTRICLE DIASTOLIC VOLUME: 149 CM3
LEFT VENTRICLE SYSTOLIC VOLUME INDEX: 21.91 CM3/M2
LEFT VENTRICLE SYSTOLIC VOLUME: 56.3 CM3
LEFT VENTRICULAR INTERNAL DIMENSION IN DIASTOLE: 5.4 CM (ref 11.81–16.42)
LEFT VENTRICULAR POSTERIOR WALL IN END DIASTOLE: 1.03 CM (ref 1.21–2.26)
LV DIASTOLIC VOLUME: 119 CM3
LV ESV (APICAL 2 CHAMBER): 37.6 CM3
LVAD-AP2: 36.8 CM2
LVAD-AP4: 41 CM2
LVAS-AP2: 17.6 CM2
LVAS-AP4: 22.8 CM2
LVEDVI(A2C): 46.3 CM3/M2
LVEDVI(BP): 52.14 CM3/M2
LVESVI(A2C): 14.63 CM3/M2
LVESVI(BP): 19.07 CM3/M2
LVLD-AP2: 9.57 CM
LVLD-AP4: 9.45 CM
LVLS-AP2: 6.94 CM
LVLS-AP4: 7.88 CM
LVOT 2D: 2.5 CM
LVOT A: 4.91 CM2
LVOT PEAK VELOCITY: 0.99 M/S
LVOT PG: 4 MMHG
MLH CV ECHO AVA INDEX VELOCITY RATIO: 1.4
MV E'TISSUE VEL-LAT: 0.1 M/S
MV E'TISSUE VEL-MED: 0.09 M/S
MV PEAK A VEL: 0.62 M/S
MV PEAK E VEL: 0.6 M/S
POSTERIOR WALL: 1.03 CM
RVOT VMAX: 0.55 M/S
SEPTAL TISSUE DOPPLER FREE WALL LATE DIA VELOCITY (APICAL 4 CHAMBER VIEW): 0.12 M/S
STRESS BASELINE BP: NORMAL MMHG
STRESS BASELINE HR: 80 BPM
STRESS ECHO POST RECOVERY HR: 115 BPM
STRESS PERCENT HR: 89 %
STRESS POST ESTIMATED WORKLOAD: 12.3 METS
STRESS POST EXERCISE DUR MIN: 9 MIN
STRESS POST EXERCISE DUR SEC: 49 SEC
STRESS POST PEAK BP: NORMAL MMHG
STRESS POST PEAK HR: 153 BPM
STRESS TARGET HR: 146 BPM
Z-SCORE OF LEFT VENTRICULAR DIMENSION IN END DIASTOLE: -9.47
Z-SCORE OF LEFT VENTRICULAR DIMENSION IN END SYSTOLE: -6.88
Z-SCORE OF LEFT VENTRICULAR POSTERIOR WALL IN END DIASTOLE: -2.46

## 2023-06-02 PROCEDURE — 93320 DOPPLER ECHO COMPLETE: CPT | Performed by: INTERNAL MEDICINE

## 2023-06-02 PROCEDURE — 93325 DOPPLER ECHO COLOR FLOW MAPG: CPT | Performed by: INTERNAL MEDICINE

## 2023-06-02 PROCEDURE — 93351 STRESS TTE COMPLETE: CPT | Performed by: INTERNAL MEDICINE

## 2023-08-25 DIAGNOSIS — F90.2 ATTENTION DEFICIT HYPERACTIVITY DISORDER (ADHD), COMBINED TYPE: ICD-10-CM

## 2023-08-25 DIAGNOSIS — J45.20 MILD INTERMITTENT ASTHMA WITHOUT COMPLICATION: Primary | ICD-10-CM

## 2023-08-25 RX ORDER — CEPHALEXIN 250 MG/1
1 CAPSULE ORAL DAILY PRN
Qty: 60 EACH | Refills: 11 | Status: SHIPPED | OUTPATIENT
Start: 2023-08-25 | End: 2024-06-25 | Stop reason: SDUPTHER

## 2023-08-25 RX ORDER — METHYLPHENIDATE HYDROCHLORIDE 36 MG/1
36 TABLET ORAL EVERY MORNING
Qty: 90 TABLET | Refills: 0 | Status: SHIPPED | OUTPATIENT
Start: 2023-08-25 | End: 2024-02-11 | Stop reason: SDUPTHER

## 2024-06-25 DIAGNOSIS — F90.2 ATTENTION DEFICIT HYPERACTIVITY DISORDER (ADHD), COMBINED TYPE: ICD-10-CM

## 2024-06-25 RX ORDER — CEPHALEXIN 250 MG/1
1 CAPSULE ORAL DAILY PRN
Qty: 60 EACH | Refills: 11 | Status: SHIPPED | OUTPATIENT
Start: 2024-06-25 | End: 2024-07-12 | Stop reason: SDUPTHER

## 2024-06-26 DIAGNOSIS — J45.20 MILD INTERMITTENT ASTHMA WITHOUT COMPLICATION: ICD-10-CM

## 2024-06-27 DIAGNOSIS — J45.20 MILD INTERMITTENT ASTHMA WITHOUT COMPLICATION: ICD-10-CM

## 2024-06-27 RX ORDER — METHYLPHENIDATE HYDROCHLORIDE 36 MG/1
36 TABLET ORAL EVERY MORNING
Qty: 90 TABLET | Refills: 0 | Status: SHIPPED | OUTPATIENT
Start: 2024-06-27 | End: 2024-06-27 | Stop reason: SDUPTHER

## 2024-06-27 NOTE — TELEPHONE ENCOUNTER
[facility] is out of rx does not have enough to fill.       Patient notes he was advised by [facility] to have rx sent to [facility] in [place] with a note from provider that ok to fill early        Patient notes they are having mediation shortages in [place] with this class of mediation.       Patient notes his script was last filled in may but fill in [place]. He had famiy  rx but is not returning to [place] until the fall.       Advised patient that to double check with [facility] that rx in in stock.   Will send request to DR.Frankel for review.

## 2024-06-28 RX ORDER — METHYLPHENIDATE HYDROCHLORIDE 36 MG/1
36 TABLET ORAL EVERY MORNING
Qty: 90 TABLET | Refills: 0 | Status: SHIPPED | OUTPATIENT
Start: 2024-06-28 | End: 2024-11-23 | Stop reason: SDUPTHER

## 2024-06-28 NOTE — TELEPHONE ENCOUNTER
Patient calling back.  Says medication needs to be sent back to [id number] - [place], [place] - [address] [facility] [[id number]]  instead. Says the [facility] does not have the medication but [facility] does,

## 2024-07-12 DIAGNOSIS — F90.2 ATTENTION DEFICIT HYPERACTIVITY DISORDER (ADHD), COMBINED TYPE: ICD-10-CM

## 2024-07-12 RX ORDER — CEPHALEXIN 250 MG/1
1 CAPSULE ORAL DAILY PRN
Qty: 60 EACH | Refills: 11 | Status: SHIPPED | OUTPATIENT
Start: 2024-07-12 | End: 2024-11-23 | Stop reason: SDUPTHER

## 2024-10-07 ENCOUNTER — OFFICE VISIT (OUTPATIENT)
Dept: FAMILY MEDICINE | Facility: CLINIC | Age: 49
End: 2024-10-07
Payer: COMMERCIAL

## 2024-10-07 VITALS
HEART RATE: 76 BPM | TEMPERATURE: 96.5 F | BODY MASS INDEX: 36.35 KG/M2 | DIASTOLIC BLOOD PRESSURE: 84 MMHG | SYSTOLIC BLOOD PRESSURE: 130 MMHG | HEIGHT: 74 IN | OXYGEN SATURATION: 97 % | WEIGHT: 283.2 LBS

## 2024-10-07 DIAGNOSIS — J45.902 ASTHMA WITH STATUS ASTHMATICUS, UNSPECIFIED ASTHMA SEVERITY, UNSPECIFIED WHETHER PERSISTENT: ICD-10-CM

## 2024-10-07 DIAGNOSIS — F90.2 ATTENTION DEFICIT HYPERACTIVITY DISORDER (ADHD), COMBINED TYPE: ICD-10-CM

## 2024-10-07 DIAGNOSIS — J45.20 MILD INTERMITTENT ASTHMA WITHOUT COMPLICATION: ICD-10-CM

## 2024-10-07 DIAGNOSIS — Z00.00 PREVENTATIVE HEALTH CARE: Primary | ICD-10-CM

## 2024-10-07 PROCEDURE — 90750 HZV VACC RECOMBINANT IM: CPT | Performed by: FAMILY MEDICINE

## 2024-10-07 PROCEDURE — 90656 IIV3 VACC NO PRSV 0.5 ML IM: CPT | Performed by: FAMILY MEDICINE

## 2024-10-07 PROCEDURE — 90471 IMMUNIZATION ADMIN: CPT | Performed by: FAMILY MEDICINE

## 2024-10-07 PROCEDURE — 36415 COLL VENOUS BLD VENIPUNCTURE: CPT | Performed by: FAMILY MEDICINE

## 2024-10-07 PROCEDURE — 90472 IMMUNIZATION ADMIN EACH ADD: CPT | Performed by: FAMILY MEDICINE

## 2024-10-07 PROCEDURE — 99396 PREV VISIT EST AGE 40-64: CPT | Mod: 25 | Performed by: FAMILY MEDICINE

## 2024-10-07 PROCEDURE — 3008F BODY MASS INDEX DOCD: CPT | Performed by: FAMILY MEDICINE

## 2024-10-07 ASSESSMENT — ENCOUNTER SYMPTOMS
POLYPHAGIA: 0
NERVOUS/ANXIOUS: 0
FATIGUE: 0
CONSTIPATION: 0
BRUISES/BLEEDS EASILY: 0
HEADACHES: 0
BLOOD IN STOOL: 0
SHORTNESS OF BREATH: 0
BACK PAIN: 0
DIARRHEA: 0
PALPITATIONS: 0
POLYDIPSIA: 0
ARTHRALGIAS: 0

## 2024-10-07 NOTE — PROGRESS NOTES
[address] [address]  [phone]       Reason for visit:   Chief Complaint   Patient presents with    Annual Exam      HPI   Phill Brown is a 49 y.o. male who presents with CPE. He has ADHD and asthma. He has flight anxiety and needs lorazepam. He is exercising. Diet is farly good.       Past Medical History:   Diagnosis Date    ADHD     Asthma     Constipation     COVID-19 vaccine series completed     Umbilical hernia 8/17/2021     Past Surgical History   Procedure Laterality Date    Appendectomy      Epidural block injection      times 2 on Back     Hernia repair      HERNIA UMBILICAL REPAIR with Mesh N/A 9/3/2021    Performed by Natalie Hernandez MD at [facility] OR [facility]    Umbilical hernia repair  09/2021    [place] tooth extraction       Social History     Tobacco Use    Smoking status: Never     Passive exposure: Never    Smokeless tobacco: Never   Vaping Use    Vaping status: Never Used   Substance Use Topics    Alcohol use: Yes     Comment: very rare 1x a month    Drug use: Never      Social History     Social History Narrative    Do you wear your seatbelt? Yes    Do you have smoke detector in your home? Yes    Do you have a carbon monoxide detector in your home? Yes    Current Occupation?    Current Marital Status?      Family History   Problem Relation Name Age of Onset    Heart attack Biological Mother  65    Anxiety disorder Biological Mother      Depression Biological Mother      Prostate cancer Biological Father       Penicillins  Current Outpatient Medications   Medication Sig Dispense Refill    ADVAIR DISKUS 100-50 mcg/dose diskus inhaler Inhale 1 puff daily as needed (wheezing, cough, shortness of breat). 60 each 11    albuterol HFA (VENTOLIN HFA) 90 mcg/actuation inhaler Inhale 2 puffs every 6 (six) hours as needed for wheezing. 18 g 3    LORazepam (ATIVAN) 1 mg tablet Take 1 pill 30 minutes prior to air flight  "1 tablet 0    methylphenidate HCl 36 mg ER tablet Take 1 tablet (36 mg total) by mouth every morning. 90 tablet 0    multivit with minerals/lutein (MULTIVITAMIN 50 PLUS ORAL) No SIG Entered      NON FORMULARY MEDICATION REQUEST quadriden ointment antibiotic for bug bites      hydrocortisone (ANUSOL-HC) 2.5 % rectal cream Insert into the rectum 2 (two) times a day. 60 g 1     No current facility-administered medications for this visit.       Patient Active Problem List    Diagnosis Date Noted    Pain of upper abdomen 01/04/2023    Dengue fever 05/06/2019    Preventative health care 11/20/2018    Family history of coronary artery disease 11/19/2018    ADHD (attention deficit hyperactivity disorder) 10/24/2017    Other chest pain 04/27/2016    Asthma 04/26/2016         Review of Systems   Constitutional:  Negative for fatigue.   HENT:  Negative for hearing loss.    Eyes:  Negative for visual disturbance.   Respiratory:  Negative for shortness of breath.    Cardiovascular:  Negative for chest pain, palpitations and leg swelling.   Gastrointestinal:  Negative for blood in stool, constipation and diarrhea.   Endocrine: Negative for polydipsia, polyphagia and polyuria.   Musculoskeletal:  Negative for arthralgias and back pain.   Skin:  Negative for rash.   Neurological:  Negative for headaches.   Hematological:  Does not bruise/bleed easily.   Psychiatric/Behavioral:  The patient is not nervous/anxious.      Objective   Vitals:    10/07/24 0908   BP: 130/84   BP Location: Left upper arm   Patient Position: Sitting   Pulse: 76   Temp: (!) 35.8 °C (96.5 °F)   TempSrc: Temporal   SpO2: 97%   Weight: 128 kg (283 lb 3.2 oz)   Height: 1.88 m (6' 2\")     Body mass index is 36.36 kg/m².  Physical Exam  Constitutional:       Appearance: Normal appearance.   HENT:      Right Ear: Tympanic membrane and ear canal normal.      Left Ear: Tympanic membrane and ear canal normal.   Eyes:      Extraocular Movements: Extraocular movements " intact.      Conjunctiva/sclera: Conjunctivae normal.      Pupils: Pupils are equal, round, and reactive to light.   Neck:      Thyroid: No thyromegaly.      Vascular: No carotid bruit.   Cardiovascular:      Rate and Rhythm: Normal rate and regular rhythm.      Pulses: Normal pulses.      Heart sounds: Normal heart sounds.   Pulmonary:      Effort: Pulmonary effort is normal.      Breath sounds: Normal breath sounds.   Abdominal:      General: Abdomen is flat. Bowel sounds are normal.      Palpations: Abdomen is soft. There is no hepatomegaly or splenomegaly.   Musculoskeletal:         General: Normal range of motion.      Cervical back: Normal range of motion.      Right lower leg: No edema.      Left lower leg: No edema.   Lymphadenopathy:      Cervical: No cervical adenopathy.   Skin:     General: Skin is warm and dry.   Neurological:      General: No focal deficit present.      Mental Status: He is alert and oriented to person, place, and time.      Deep Tendon Reflexes: Reflexes normal.   Psychiatric:         Mood and Affect: Mood normal.         Behavior: Behavior normal.         Thought Content: Thought content normal.         Judgment: Judgment normal.         Procedures    Lab Results   Component Value Date    WBC 6.1 05/02/2022    HGB 17.0 05/02/2022    HCT 50.3 05/02/2022     05/02/2022    CHOL 197 05/02/2022    TRIG 113 05/02/2022    HDL 58 05/02/2022    LDLCALC 119 (H) 05/02/2022    ALT 35 05/02/2022    AST 24 05/02/2022     05/02/2022    K 4.3 05/02/2022     05/02/2022    CREATININE 1.11 05/02/2022    BUN 17 05/02/2022    CO2 21 05/02/2022    PSA 0.8 05/02/2022    GLUCOSE 103 (H) 05/02/2022           Assessment   Problem List Items Addressed This Visit       ADHD (attention deficit hyperactivity disorder)     Stable  Continue meds         Asthma     Stable  Continue meds         Preventative health care - Primary     Labs  Shingrix vaccine  Flu vax         Relevant Orders    CBC and  Differential    Comprehensive metabolic panel    Hemoglobin A1c    Lipid panel    Hepatitis C antibody    HIV 1,2 AB P24 AG    PSA    RESOLVED: Asthma with status asthmaticus, unspecified asthma severity, unspecified whether persistent           Harry A. Frankel, MD  10/7/2024

## 2024-10-08 LAB
ALBUMIN SERPL-MCNC: 4.7 G/DL (ref 4.1–5.1)
ALP SERPL-CCNC: 47 IU/L (ref 44–121)
ALT SERPL-CCNC: 31 IU/L (ref 0–44)
AST SERPL-CCNC: 29 IU/L (ref 0–40)
BASOPHILS # BLD AUTO: 0 X10E3/UL (ref 0–0.2)
BASOPHILS NFR BLD AUTO: 1 %
BILIRUB SERPL-MCNC: 1.2 MG/DL (ref 0–1.2)
BUN SERPL-MCNC: 18 MG/DL (ref 6–24)
BUN/CREAT SERPL: 16 (ref 9–20)
CALCIUM SERPL-MCNC: 9.8 MG/DL (ref 8.7–10.2)
CHLORIDE SERPL-SCNC: 104 MMOL/L (ref 96–106)
CHOLEST SERPL-MCNC: 214 MG/DL (ref 100–199)
CO2 SERPL-SCNC: 19 MMOL/L (ref 20–29)
CREAT SERPL-MCNC: 1.11 MG/DL (ref 0.76–1.27)
EGFRCR SERPLBLD CKD-EPI 2021: 81 ML/MIN/1.73
EOSINOPHIL # BLD AUTO: 0.3 X10E3/UL (ref 0–0.4)
EOSINOPHIL NFR BLD AUTO: 6 %
ERYTHROCYTE [DISTWIDTH] IN BLOOD BY AUTOMATED COUNT: 13 % (ref 11.6–15.4)
GLOBULIN SER CALC-MCNC: 2.4 G/DL (ref 1.5–4.5)
GLUCOSE SERPL-MCNC: 97 MG/DL (ref 70–99)
HBA1C MFR BLD: 5.7 % (ref 4.8–5.6)
HCT VFR BLD AUTO: 50.6 % (ref 37.5–51)
HCV IGG SERPL QL IA: NON REACTIVE
HDLC SERPL-MCNC: 57 MG/DL
HGB BLD-MCNC: 16.3 G/DL (ref 13–17.7)
HIV 1+2 AB+HIV1 P24 AG SERPL QL IA: NON REACTIVE
IMM GRANULOCYTES # BLD AUTO: 0 X10E3/UL (ref 0–0.1)
IMM GRANULOCYTES NFR BLD AUTO: 0 %
LDLC SERPL CALC-MCNC: 133 MG/DL (ref 0–99)
LYMPHOCYTES # BLD AUTO: 2.2 X10E3/UL (ref 0.7–3.1)
LYMPHOCYTES NFR BLD AUTO: 38 %
MCH RBC QN AUTO: 30 PG (ref 26.6–33)
MCHC RBC AUTO-ENTMCNC: 32.2 G/DL (ref 31.5–35.7)
MCV RBC AUTO: 93 FL (ref 79–97)
MONOCYTES # BLD AUTO: 0.5 X10E3/UL (ref 0.1–0.9)
MONOCYTES NFR BLD AUTO: 9 %
NEUTROPHILS # BLD AUTO: 2.7 X10E3/UL (ref 1.4–7)
NEUTROPHILS NFR BLD AUTO: 46 %
PLATELET # BLD AUTO: 241 X10E3/UL (ref 150–450)
POTASSIUM SERPL-SCNC: 4.5 MMOL/L (ref 3.5–5.2)
PROT SERPL-MCNC: 7.1 G/DL (ref 6–8.5)
PSA SERPL-MCNC: 0.7 NG/ML (ref 0–4)
RBC # BLD AUTO: 5.43 X10E6/UL (ref 4.14–5.8)
SODIUM SERPL-SCNC: 144 MMOL/L (ref 134–144)
TRIGL SERPL-MCNC: 133 MG/DL (ref 0–149)
VLDLC SERPL CALC-MCNC: 24 MG/DL (ref 5–40)
WBC # BLD AUTO: 5.8 X10E3/UL (ref 3.4–10.8)

## 2024-11-23 DIAGNOSIS — F90.2 ATTENTION DEFICIT HYPERACTIVITY DISORDER (ADHD), COMBINED TYPE: ICD-10-CM

## 2024-11-25 RX ORDER — CEPHALEXIN 250 MG/1
1 CAPSULE ORAL DAILY PRN
Qty: 60 EACH | Refills: 11 | Status: SHIPPED | OUTPATIENT
Start: 2024-11-25

## 2025-03-10 DIAGNOSIS — J45.20 MILD INTERMITTENT ASTHMA WITHOUT COMPLICATION: ICD-10-CM

## 2025-03-10 DIAGNOSIS — F90.2 ATTENTION DEFICIT HYPERACTIVITY DISORDER (ADHD), COMBINED TYPE: Primary | ICD-10-CM

## 2025-03-10 RX ORDER — METHYLPHENIDATE HYDROCHLORIDE 36 MG/1
36 TABLET ORAL EVERY MORNING
Qty: 90 TABLET | Refills: 0 | Status: CANCELLED | OUTPATIENT
Start: 2025-03-10

## 2025-03-11 NOTE — TELEPHONE ENCOUNTER
Spoke with pharmacy rx is too soon to fill rx last picked up on 1/2/2025. Would be too soon to fill until 4/2/2025

## 2025-08-12 DIAGNOSIS — J45.20 MILD INTERMITTENT ASTHMA WITHOUT COMPLICATION: ICD-10-CM

## 2025-08-12 DIAGNOSIS — F90.2 ATTENTION DEFICIT HYPERACTIVITY DISORDER (ADHD), COMBINED TYPE: ICD-10-CM

## 2025-08-12 RX ORDER — CEPHALEXIN 250 MG/1
1 CAPSULE ORAL DAILY PRN
Qty: 60 EACH | Refills: 11 | Status: SHIPPED | OUTPATIENT
Start: 2025-08-12

## 2025-08-12 RX ORDER — METHYLPHENIDATE HYDROCHLORIDE 36 MG/1
36 TABLET ORAL EVERY MORNING
Qty: 90 TABLET | Refills: 0 | Status: SHIPPED | OUTPATIENT
Start: 2025-08-12

## (undated) DEVICE — GAUZE 2X2 12PLY STERILE EXRAY DMT

## (undated) DEVICE — GLOVE SURG PROTEXIS PF 6.5

## (undated) DEVICE — PATCH VENTRAL PROCEED 4.3CM: Type: IMPLANTABLE DEVICE | Site: ABDOMEN | Status: NON-FUNCTIONAL

## (undated) DEVICE — DRESSING SPONGE GAUZE 4X4 STER

## (undated) DEVICE — PACK RFID MLHS MINOR PROC STDZ

## (undated) DEVICE — ***USE 57698*** SLEEVE FLOWTRON DVT CALF SINGLE USE

## (undated) DEVICE — ***USE 138849*** SUTURE PDS II  1  Z347H

## (undated) DEVICE — SUTURE VICRYL 3-0 J416H SH UNDYED

## (undated) DEVICE — GLOVE SZ 7 LINER PROTEXIS PI BL

## (undated) DEVICE — ***USE 138508*** SUTURE PROLENE  2-0 8685H

## (undated) DEVICE — COVER LIGHTHANDLE

## (undated) DEVICE — APPLICATOR CHLORAPREP 26ML ORANGE TINT

## (undated) DEVICE — TUBING SMOKE EVAC PENCIL COATED

## (undated) DEVICE — GOWN SURGICAL REINFORCED X-LAR

## (undated) DEVICE — ELECTROSURGICAL TIP CLEANER

## (undated) DEVICE — DRESSING TEGADERM 4X4 3/4

## (undated) DEVICE — STERISTRIP 1/2INX4IN

## (undated) DEVICE — Device

## (undated) DEVICE — PAD GROUND ELECTROSURGICAL W/CORD

## (undated) DEVICE — DRAPE HALF STERILE

## (undated) DEVICE — SUTURE MONOCRYL 4-0  Y496G PS-2 I8IN

## (undated) DEVICE — MASTISOL LIQUID ADHESIVE

## (undated) DEVICE — DRESSING ADAPTIC STERILE 3X3

## (undated) DEVICE — SOLN IRRIG .9%SOD 1000ML

## (undated) DEVICE — RESERVOIR DRAIN 100ML